# Patient Record
Sex: FEMALE | Race: BLACK OR AFRICAN AMERICAN | Employment: OTHER | URBAN - METROPOLITAN AREA
[De-identification: names, ages, dates, MRNs, and addresses within clinical notes are randomized per-mention and may not be internally consistent; named-entity substitution may affect disease eponyms.]

---

## 2018-12-18 ENCOUNTER — HOSPITAL ENCOUNTER (OUTPATIENT)
Dept: MAMMOGRAPHY | Age: 63
Discharge: HOME | End: 2018-12-18
Attending: FAMILY MEDICINE
Payer: COMMERCIAL

## 2018-12-18 DIAGNOSIS — Z12.39 BREAST CANCER SCREENING: ICD-10-CM

## 2018-12-18 PROCEDURE — 77067 SCR MAMMO BI INCL CAD: CPT

## 2019-01-01 ENCOUNTER — HOSPITAL ENCOUNTER (EMERGENCY)
Age: 64
Discharge: HOME | End: 2019-01-01
Attending: EMERGENCY MEDICINE
Payer: COMMERCIAL

## 2019-01-01 VITALS
OXYGEN SATURATION: 98 % | TEMPERATURE: 98.1 F | WEIGHT: 198 LBS | DIASTOLIC BLOOD PRESSURE: 74 MMHG | HEIGHT: 63 IN | HEART RATE: 66 BPM | RESPIRATION RATE: 18 BRPM | BODY MASS INDEX: 35.08 KG/M2 | SYSTOLIC BLOOD PRESSURE: 130 MMHG

## 2019-01-01 DIAGNOSIS — I48.91 ATRIAL FIBRILLATION WITH RAPID VENTRICULAR RESPONSE (HCC): Primary | ICD-10-CM

## 2019-01-01 LAB
ALBUMIN SERPL-MCNC: 3.5 G/DL (ref 3.2–4.6)
ALBUMIN/GLOB SERPL: 0.8 {RATIO} (ref 1.2–3.5)
ALP SERPL-CCNC: 101 U/L (ref 50–136)
ALT SERPL-CCNC: 28 U/L (ref 12–65)
ANION GAP SERPL CALC-SCNC: 5 MMOL/L (ref 7–16)
AST SERPL-CCNC: 28 U/L (ref 15–37)
ATRIAL RATE: 78 BPM
BACTERIA URNS QL MICRO: 0 /HPF
BASOPHILS # BLD: 0 K/UL (ref 0–0.2)
BASOPHILS NFR BLD: 1 % (ref 0–2)
BILIRUB SERPL-MCNC: 0.4 MG/DL (ref 0.2–1.1)
BUN SERPL-MCNC: 17 MG/DL (ref 8–23)
CALCIUM SERPL-MCNC: 8.6 MG/DL (ref 8.3–10.4)
CALCULATED R AXIS, ECG10: 54 DEGREES
CALCULATED T AXIS, ECG11: 97 DEGREES
CASTS URNS QL MICRO: NORMAL /LPF
CHLORIDE SERPL-SCNC: 105 MMOL/L (ref 98–107)
CO2 SERPL-SCNC: 31 MMOL/L (ref 21–32)
CREAT SERPL-MCNC: 1.05 MG/DL (ref 0.6–1)
DIAGNOSIS, 93000: NORMAL
DIFFERENTIAL METHOD BLD: ABNORMAL
EOSINOPHIL # BLD: 0.2 K/UL (ref 0–0.8)
EOSINOPHIL NFR BLD: 4 % (ref 0.5–7.8)
EPI CELLS #/AREA URNS HPF: NORMAL /HPF
ERYTHROCYTE [DISTWIDTH] IN BLOOD BY AUTOMATED COUNT: 14.9 % (ref 11.9–14.6)
GLOBULIN SER CALC-MCNC: 4.2 G/DL (ref 2.3–3.5)
GLUCOSE SERPL-MCNC: 137 MG/DL (ref 65–100)
HCT VFR BLD AUTO: 41.2 % (ref 35.8–46.3)
HGB BLD-MCNC: 13.2 G/DL (ref 11.7–15.4)
IMM GRANULOCYTES # BLD: 0 K/UL (ref 0–0.5)
IMM GRANULOCYTES NFR BLD AUTO: 1 % (ref 0–5)
LYMPHOCYTES # BLD: 2.2 K/UL (ref 0.5–4.6)
LYMPHOCYTES NFR BLD: 43 % (ref 13–44)
MCH RBC QN AUTO: 28.3 PG (ref 26.1–32.9)
MCHC RBC AUTO-ENTMCNC: 32 G/DL (ref 31.4–35)
MCV RBC AUTO: 88.2 FL (ref 79.6–97.8)
MONOCYTES # BLD: 0.5 K/UL (ref 0.1–1.3)
MONOCYTES NFR BLD: 10 % (ref 4–12)
NEUTS SEG # BLD: 2.1 K/UL (ref 1.7–8.2)
NEUTS SEG NFR BLD: 42 % (ref 43–78)
NRBC # BLD: 0 K/UL (ref 0–0.2)
PLATELET # BLD AUTO: 190 K/UL (ref 150–450)
PMV BLD AUTO: 12.7 FL (ref 9.4–12.3)
POTASSIUM SERPL-SCNC: 3.2 MMOL/L (ref 3.5–5.1)
PROT SERPL-MCNC: 7.7 G/DL (ref 6.3–8.2)
Q-T INTERVAL, ECG07: 330 MS
QRS DURATION, ECG06: 84 MS
QTC CALCULATION (BEZET), ECG08: 470 MS
RBC # BLD AUTO: 4.67 M/UL (ref 4.05–5.2)
RBC #/AREA URNS HPF: NORMAL /HPF
SODIUM SERPL-SCNC: 141 MMOL/L (ref 136–145)
TROPONIN I SERPL-MCNC: <0.02 NG/ML (ref 0.02–0.05)
VENTRICULAR RATE, ECG03: 122 BPM
WBC # BLD AUTO: 5.1 K/UL (ref 4.3–11.1)
WBC URNS QL MICRO: NORMAL /HPF

## 2019-01-01 PROCEDURE — 81003 URINALYSIS AUTO W/O SCOPE: CPT | Performed by: EMERGENCY MEDICINE

## 2019-01-01 PROCEDURE — 84484 ASSAY OF TROPONIN QUANT: CPT

## 2019-01-01 PROCEDURE — 74011250637 HC RX REV CODE- 250/637: Performed by: EMERGENCY MEDICINE

## 2019-01-01 PROCEDURE — 99285 EMERGENCY DEPT VISIT HI MDM: CPT | Performed by: EMERGENCY MEDICINE

## 2019-01-01 PROCEDURE — 81015 MICROSCOPIC EXAM OF URINE: CPT

## 2019-01-01 PROCEDURE — 93005 ELECTROCARDIOGRAM TRACING: CPT | Performed by: EMERGENCY MEDICINE

## 2019-01-01 PROCEDURE — 85025 COMPLETE CBC W/AUTO DIFF WBC: CPT

## 2019-01-01 PROCEDURE — 80053 COMPREHEN METABOLIC PANEL: CPT

## 2019-01-01 RX ADMIN — APIXABAN 5 MG: 5 TABLET, FILM COATED ORAL at 03:05

## 2019-01-01 NOTE — ED TRIAGE NOTES
Pt arrived from home, alone, via [facility] with c/o shortness of breath. Pt was awakened by a rigning telephone and felt her heart racing and a difficulty in breathing. Pt called EMS. No c/o diff breathing at this time.

## 2019-01-01 NOTE — DISCHARGE INSTRUCTIONS
Avoid Advil, Aleve, ibuprofen, Motrin products. Start Eliquis (a blood thinner) for atrial fibrillation. Twice a day. The cardiologist will help determine if you need to continue taking it long-term to prevent stroke and blood clot formation.   Return immediately if you have any other emergency

## 2019-01-01 NOTE — ED NOTES
I have reviewed discharge instructions with the patient. The patient verbalized understanding. Patient left ED via Discharge Method: ambulatory to Home with lyft Opportunity for questions and clarification provided. Patient given 1 scripts. To continue your aftercare when you leave the hospital, you may receive an automated call from our care team to check in on how you are doing. This is a free service and part of our promise to provide the best care and service to meet your aftercare needs.  If you have questions, or wish to unsubscribe from this service please call [phone]. Thank you for Choosing our [facility] Emergency Department.

## 2019-01-01 NOTE — ED PROVIDER NOTES
HPI: 
70 female history high blood pressure, high cholesterol, diabetes is here with shortness of breath. Her sister woke her up with a phone call. When she got up she started to feel short of breath. Cannot catch her breath. This went on for 45 minutes. It and call 911 to bring her in. She denies any chest pain, shortness of breath before this. Currently she feels slightly lightheaded. Had some mild tightness in her chest. 
She traveled by train a few weeks ago the [place]. He was able to get up and walk around. She has no leg swelling. No hemoptysis. Not a blood thinner. No prior heart problems. ROS Constitutional: No fever, no chills Skin: no rash Eye: No vision changes ENMT: No sore throat Respiratory: No shortness of breath, no cough Cardiovascular: No chest pain, no palpitations. Gastrointestinal: No vomiting, no nausea, no diarrhea, no abdominal pain : No dysuria MSK: No back pain, no muscle pain, no joint pain Neuro: No headache, no change in mental status, no numbness, no tingling, no weakness Psych:  
Endocrine:  
All other review of systems positive per history of present illness and the above otherwise negative or noncontributory. Visit Vitals /60 Pulse 65 Temp 98.1 °F (36.7 °C) Resp 18 Ht 5' 3\" (1.6 m) Wt 89.8 kg (198 lb) SpO2 98% BMI 35.07 kg/m² Past Medical History:  
Diagnosis Date  Arthritis   
 both hips as seen on x-rays in 12/13  Diabetes mellitus ([facility] [address]   
 controlled  Essential hypertension, benign Since 2009  Hyperlipidemia   
 acquired  Nonspecific abnormal electrocardiogram (ECG) (EKG)   
 5/1/14 EKG shows SB with HR 58, PACs and extensive T-wave changes  [place] on CPAP Since 6/14  Sleep apnea  Urticaria   
 chronic No past surgical history on file. Prior to Admission Medications Prescriptions Last Dose Informant Patient Reported? Taking? clotrimazole-betamethasone (LOTRISONE) topical cream   No No  
Sig: Apply  to affected area two (2) times a day. hydroCHLOROthiazide (HYDRODIURIL) 25 mg tablet   No No  
Sig: Take 1 Tab by mouth daily. Indications: Hypertension  
hydrocortisone butyrate (LOCOID) 0.1 % oint   No No  
Sig: Use twice daily  
ibuprofen (MOTRIN) 800 mg tablet   No No  
Sig: Take 1 Tab by mouth two (2) times daily as needed for Pain (take with food). Indications: PAIN  
loratadine (CLARITIN) 10 mg tablet   No No  
Sig: Take 1 Tab by mouth daily. losartan (COZAAR) 100 mg tablet   No No  
Sig: TAKE ONE TABLET BY MOUTH ONCE DAILY  
metFORMIN (GLUCOPHAGE) 500 mg tablet   No No  
Sig: TAKE 1 TABLET BY MOUTH AT BEDTIME  
multivitamin (ONE A DAY) tablet   Yes No  
Sig: Take 1 Tab by mouth daily. pravastatin (PRAVACHOL) 20 mg tablet   No No  
Sig: TAKE 1 TABLET BY MOUTH AT BEDTIME Facility-Administered Medications: None Adult Exam  
General: alert, no acute distress Head: normocephalic, atraumatic ENT: moist mucous membranes Neck: supple, non-tender; full range of motion Cardiovascular: tachycardic and irregularly irregular, normal peripheral perfusion, no edema. Equal radial pulses Respiratory:  normal respirations; no wheezing, rales or rhonchi Gastrointestinal: soft, non-tender; no rebound or guarding, no peritoneal signs, no distension Back: non-tender, full range of motion Musculoskeletal: normal range of motion, normal strength, no gross deformities Neurological: alert and oriented x 4, no gross focal deficits; normal speech Psychiatric: cooperative; appropriate mood and affect MDM: 
EKG atrial fibrillation with rapid ventricular response at rate of 122. Normal QS complex. No STEMI noted Apparently new onset A. Fib. Troponin negative. Electrolytes unremarkable. Bedside modified Valsalva maneuver performed. Patient performed very well. Total time of procedure took approximately 12 minutes. She converted. Second EKG sinus bradycardia at 58 with normal interval, axis. No STEMI or ischemic changes noted. 2:27 AM 
I spoke with Dr. Trent Delong from cardiology. Recommend Eliquis 5 mg twice a day with prescription to go home and follow-up with cardiology for further evaluation. No results found. Recent Results (from the past 24 hour(s)) CBC WITH AUTOMATED DIFF Collection Time: 01/01/19  1:08 AM  
Result Value Ref Range WBC 5.1 4.3 - 11.1 K/uL  
 RBC 4.67 4.05 - 5.2 M/uL  
 HGB 13.2 11.7 - 15.4 g/dL HCT 41.2 35.8 - 46.3 % MCV 88.2 79.6 - 97.8 FL  
 MCH 28.3 26.1 - 32.9 PG  
 MCHC 32.0 31.4 - 35.0 g/dL  
 RDW 14.9 (H) 11.9 - 14.6 % PLATELET 480 343 - 792 K/uL MPV 12.7 (H) 9.4 - 12.3 FL ABSOLUTE NRBC 0.00 0.0 - 0.2 K/uL  
 DF AUTOMATED NEUTROPHILS 42 (L) 43 - 78 % LYMPHOCYTES 43 13 - 44 % MONOCYTES 10 4.0 - 12.0 % EOSINOPHILS 4 0.5 - 7.8 % BASOPHILS 1 0.0 - 2.0 % IMMATURE GRANULOCYTES 1 0.0 - 5.0 %  
 ABS. NEUTROPHILS 2.1 1.7 - 8.2 K/UL  
 ABS. LYMPHOCYTES 2.2 0.5 - 4.6 K/UL  
 ABS. MONOCYTES 0.5 0.1 - 1.3 K/UL  
 ABS. EOSINOPHILS 0.2 0.0 - 0.8 K/UL  
 ABS. BASOPHILS 0.0 0.0 - 0.2 K/UL  
 ABS. IMM. GRANS. 0.0 0.0 - 0.5 K/UL METABOLIC PANEL, COMPREHENSIVE Collection Time: 01/01/19  1:08 AM  
Result Value Ref Range Sodium 141 136 - 145 mmol/L Potassium 3.2 (L) 3.5 - 5.1 mmol/L Chloride 105 98 - 107 mmol/L  
 CO2 31 21 - 32 mmol/L Anion gap 5 (L) 7 - 16 mmol/L Glucose 137 (H) 65 - 100 mg/dL BUN 17 8 - 23 MG/DL Creatinine 1.05 (H) 0.6 - 1.0 MG/DL  
 GFR est AA >60 >60 ml/min/1.73m2 GFR est non-AA 56 (L) >60 ml/min/1.73m2 Calcium 8.6 8.3 - 10.4 MG/DL Bilirubin, total 0.4 0.2 - 1.1 MG/DL  
 ALT (SGPT) 28 12 - 65 U/L  
 AST (SGOT) 28 15 - 37 U/L Alk. phosphatase 101 50 - 136 U/L Protein, total 7.7 6.3 - 8.2 g/dL Albumin 3.5 3.2 - 4.6 g/dL Globulin 4.2 (H) 2.3 - 3.5 g/dL A-G Ratio 0.8 (L) 1.2 - 3.5 EKG, 12 LEAD, INITIAL Collection Time: 01/01/19  1:38 AM  
Result Value Ref Range Ventricular Rate 58 BPM  
 Atrial Rate 58 BPM  
 P-R Interval 164 ms QRS Duration 90 ms Q-T Interval 422 ms QTC Calculation (Bezet) 414 ms Calculated P Axis 61 degrees Calculated R Axis 60 degrees Calculated T Axis 39 degrees Diagnosis Sinus bradycardia Nonspecific ST and T wave abnormality Abnormal ECG When compared with ECG of 01-JAN-2019 01:06, Sinus rhythm has replaced Atrial fibrillation Vent. rate has decreased BY  64 BPM 
ST no longer depressed in Anterior leads T wave inversion no longer evident in Inferior leads URINE MICROSCOPIC Collection Time: 01/01/19  1:48 AM  
Result Value Ref Range WBC 0-3 0 /hpf  
 RBC 0-3 0 /hpf Epithelial cells 0-3 0 /hpf Bacteria 0 0 /hpf Casts 0-3 0 /lpf Dragon voice recognition software was used to create this note. Although the note has been reviewed and corrected where necessary, additional errors may have been overlooked and remain in the text.

## 2019-03-18 ENCOUNTER — HOSPITAL ENCOUNTER (OUTPATIENT)
Dept: MAMMOGRAPHY | Age: 64
Discharge: HOME | End: 2019-03-18
Attending: FAMILY MEDICINE
Payer: COMMERCIAL

## 2019-03-18 DIAGNOSIS — R92.8 ABNORMAL SCREENING MAMMOGRAM: ICD-10-CM

## 2019-03-18 PROCEDURE — 76642 ULTRASOUND BREAST LIMITED: CPT

## 2019-03-18 PROCEDURE — 77065 DX MAMMO INCL CAD UNI: CPT

## 2021-03-12 ENCOUNTER — TRANSCRIBE ORDER (OUTPATIENT)
Dept: SCHEDULING | Age: 66
End: 2021-03-12

## 2021-03-12 DIAGNOSIS — Z12.31 ENCOUNTER FOR SCREENING MAMMOGRAM FOR MALIGNANT NEOPLASM OF BREAST: Primary | ICD-10-CM

## 2021-03-12 DIAGNOSIS — Z78.0 POST-MENOPAUSAL: Primary | ICD-10-CM

## 2022-06-03 ENCOUNTER — TELEPHONE (OUTPATIENT)
Dept: DIABETES SERVICES | Age: 67
End: 2022-06-03

## 2022-06-08 ENCOUNTER — FOLLOWUP TELEPHONE ENCOUNTER (OUTPATIENT)
Dept: DIABETES SERVICES | Age: 67
End: 2022-06-08

## 2022-06-15 ENCOUNTER — TELEPHONE (OUTPATIENT)
Dept: DIABETES SERVICES | Age: 67
End: 2022-06-15

## 2022-06-15 NOTE — TELEPHONE ENCOUNTER
Third call,mail box full. Will mail letter. If do not hear back by 7-6-2022, will close file. Notified referring.

## 2022-08-23 ENCOUNTER — HOSPITAL ENCOUNTER (OUTPATIENT)
Dept: DIABETES SERVICES | Age: 67
Setting detail: RECURRING SERIES
Discharge: HOME | End: 2022-08-26
Payer: MEDICARE

## 2022-08-23 PROCEDURE — G0108 DIAB MANAGE TRN  PER INDIV: HCPCS

## 2022-08-23 RX ORDER — HYDROCHLOROTHIAZIDE 25 MG/1
25 TABLET ORAL DAILY
COMMUNITY

## 2022-08-23 RX ORDER — MULTIVITAMIN WITH IRON
1 TABLET ORAL DAILY
COMMUNITY

## 2022-08-23 RX ORDER — TRIAMCINOLONE ACETONIDE 5 MG/G
OINTMENT TOPICAL 2 TIMES DAILY
COMMUNITY

## 2022-08-23 RX ORDER — AMLODIPINE BESYLATE 2.5 MG/1
2.5 TABLET ORAL DAILY
COMMUNITY

## 2022-08-23 RX ORDER — LOSARTAN POTASSIUM 100 MG/1
100 TABLET ORAL DAILY
COMMUNITY

## 2022-08-23 RX ORDER — POTASSIUM CHLORIDE 1.5 G/1.77G
20 POWDER, FOR SOLUTION ORAL 2 TIMES DAILY
COMMUNITY

## 2022-08-23 SDOH — ECONOMIC STABILITY: FOOD INSECURITY: ADDITIONAL INFORMATION: NO

## 2022-08-23 ASSESSMENT — PROBLEM AREAS IN DIABETES QUESTIONNAIRE (PAID)
FEELING THAT DIABETES IS TAKING UP TOO MUCH OF YOUR MENTAL AND PHYSICAL ENERGY EVERY DAY: 0
FEELING SCARED WHEN YOU THINK ABOUT LIVING WITH DIABETES: 0
WORRYING ABOUT THE FUTURE AND THE POSSIBILITY OF SERIOUS COMPLICATIONS: 0
COPING WITH COMPLICATIONS OF DIABETES: 0
PAID-5 TOTAL SCORE: 0
FEELING DEPRESSED WHEN YOU THINK ABOUT LIVING WITH DIABETES: 0

## 2022-08-23 ASSESSMENT — PATIENT HEALTH QUESTIONNAIRE - PHQ9
SUM OF ALL RESPONSES TO PHQ QUESTIONS 1-9: 0

## 2022-08-23 NOTE — PROGRESS NOTES
This is a one on one appointment. Due to being during XCXKP-76 public health emergency, social distancing and mandatory precautions are in place and utilized. Came for diabetes educational assessment today. Provided basic information on carbohydrates, proteins and fats. Educational need/plan: Will attend 2 nutrition/2 diabetes sessions to address the following: diabetes disease process, nutritional management, physical activity, using medications, preventing complications, psychosocial adjustment, goal setting, problem solving, monitoring, behavior change strategies. Hopes to gain the following from this educational program:  Being active, healthy coping, healthy eating, monitoring blood sugars, problem solving, taking medications and reducing risk of complications. Issues Identified:  -Vegan. Her protein sources are beans,peas, lentils, and peanut butter. She does not eat meat, cheese or eggs.   -She wants to make sure she is getting enough protein and vitamins.  -She wants to lose weight  -Provided Medications, Lipids, and Hgb A1C, Height and Weight from My Chart. This is external referral.  -She would like the Dexcom G6. Will send message to her PCP via fax. Medication Reconciliation completed at today's visit.

## 2022-08-24 ENCOUNTER — HOSPITAL ENCOUNTER (OUTPATIENT)
Dept: DIABETES SERVICES | Age: 67
Setting detail: RECURRING SERIES
Discharge: HOME | End: 2022-08-27
Payer: MEDICARE

## 2022-08-24 PROCEDURE — G0109 DIAB MANAGE TRN IND/GROUP: HCPCS

## 2022-08-24 NOTE — PROGRESS NOTES
This is a class  appointment with limited persons allowed in class due to RDNCT-06 public health emergency. Social distancing and mandatory precautions are in place and utilized. Participant attended Diabetes #1 session today. Topics included: Characteristics/pathophysiology type 1/type 2 diabetes; Goal/acceptable blood glucose ranges/Hgb A1C/interpreting/using results;meters, continuous glucose monitors and insulin pumps. Using medications safely; Sick day management; Prevention/detection/treatment of acute complications. - Verbalized understanding of material covered.  -Anticipated adherence is good   -Problems/barriers may be  none anticipated   Personal   Vegan and wants to lose weight and make sure getting proper amount of protein and nutrients in her diet. Only eats peanut butter and beans, peas and lentils as protein source. Medication Reconciliation Completed. No surgery or procedure.

## 2022-08-25 ENCOUNTER — HOSPITAL ENCOUNTER (OUTPATIENT)
Dept: DIABETES SERVICES | Age: 67
Setting detail: RECURRING SERIES
Discharge: HOME | End: 2022-08-28
Payer: MEDICARE

## 2022-08-25 PROCEDURE — G0109 DIAB MANAGE TRN IND/GROUP: HCPCS

## 2022-08-29 ENCOUNTER — HOSPITAL ENCOUNTER (OUTPATIENT)
Dept: DIABETES SERVICES | Age: 67
Setting detail: RECURRING SERIES
Discharge: HOME | End: 2022-09-01
Payer: MEDICARE

## 2022-08-29 PROCEDURE — G0109 DIAB MANAGE TRN IND/GROUP: HCPCS

## 2022-08-29 NOTE — PROGRESS NOTES
This is a class  appointment with limited persons allowed in class due to IYFXK-50 public health emergency. Social distancing and mandatory precautions are in place and utilized. Participant attended Diabetes #2 session today. Topics included: Prevention/detection/treatment of chronic complications; sleep apnea; Developing strategies to promote health/change behavior/recommended screenings; Developing strategies to address psychosocial issues; Goal setting. Participants goal/support plan includes:        Diabetes Goal: To better control B/P, Cholesterol, and blood sugar, I will exercise 15-30 minutes 3-5 days a week beginning today. I will reevaluate in two weeks and increase as tolerated. Diabetes Support Plan: Refer to Diabetes Education Material        Problems/barriers may be:none anticipated         Plan for follow up/Recommendations: Nutrition Two                    Medication Reconciliation Completed. No new surgery or procedures.

## 2022-08-30 ENCOUNTER — HOSPITAL ENCOUNTER (OUTPATIENT)
Dept: DIABETES SERVICES | Age: 67
Setting detail: RECURRING SERIES
Discharge: HOME | End: 2022-09-02
Payer: MEDICARE

## 2022-08-30 PROCEDURE — G0109 DIAB MANAGE TRN IND/GROUP: HCPCS

## 2022-08-30 NOTE — PROGRESS NOTES
This is a class  appointment with limited persons allowed in class due to IOTZG-00 public health emergency. Social distancing and mandatory precautions are in place and utilized. Attended nutrition diabetes #2 group session today. Topics included: plate method for portion control; fiber and sodium guidelines; sugar substitutes; alcohol; eating out; recipe modification; label reading. Participant's goal: To keep blood sugars in target ranges she will eat a 7 gram protein minimum with every meal/snack and re-evaluate November 29, 2022. Participant's diabetes support plan: Use the vegan protein handout provided. Barriers identified: none  Voiced/demonstrated understanding of material covered. Anticipated adherence is good. No medication changes, procedures or surgeries since last visit. Plan for follow up is: will be sent a follow up questionnaire at 6 months and one year.

## 2022-09-29 ENCOUNTER — TELEPHONE (OUTPATIENT)
Dept: DIABETES SERVICES | Age: 67
End: 2022-09-29

## 2023-02-22 ENCOUNTER — TELEPHONE (OUTPATIENT)
Dept: DIABETES SERVICES | Age: 68
End: 2023-02-22

## 2023-02-22 NOTE — PROGRESS NOTES
Attempted to contact patient for 6 month follow up call for [employer] [facility], but was unable to reach patient. Will mail follow-up survey instead.

## 2024-09-02 ENCOUNTER — HOSPITAL ENCOUNTER (EMERGENCY)
Age: 69
Discharge: HOME | End: 2024-09-02
Attending: EMERGENCY MEDICINE
Payer: MEDICARE

## 2024-09-02 VITALS
TEMPERATURE: 98.4 F | HEIGHT: 63 IN | OXYGEN SATURATION: 98 % | RESPIRATION RATE: 18 BRPM | BODY MASS INDEX: 33.31 KG/M2 | SYSTOLIC BLOOD PRESSURE: 126 MMHG | WEIGHT: 188 LBS | DIASTOLIC BLOOD PRESSURE: 85 MMHG | HEART RATE: 63 BPM

## 2024-09-02 DIAGNOSIS — K11.20 SIALOADENITIS: Primary | ICD-10-CM

## 2024-09-02 DIAGNOSIS — K11.5 SIALOLITHIASIS OF SUBMANDIBULAR GLAND: ICD-10-CM

## 2024-09-02 PROCEDURE — 99283 EMERGENCY DEPT VISIT LOW MDM: CPT

## 2024-09-02 RX ORDER — CLINDAMYCIN HCL 300 MG
300 CAPSULE ORAL 3 TIMES DAILY
Qty: 21 CAPSULE | Refills: 0 | Status: SHIPPED | OUTPATIENT
Start: 2024-09-02 | End: 2024-09-09

## 2024-09-02 RX ORDER — DICLOFENAC POTASSIUM 50 MG/1
50 TABLET, FILM COATED ORAL 3 TIMES DAILY
Qty: 20 TABLET | Refills: 0 | Status: SHIPPED | OUTPATIENT
Start: 2024-09-02

## 2024-09-02 ASSESSMENT — ENCOUNTER SYMPTOMS
VOMITING: 0
FACIAL SWELLING: 1
NAUSEA: 0
EYE REDNESS: 0
BACK PAIN: 0
PHOTOPHOBIA: 0
COLOR CHANGE: 0
CHEST TIGHTNESS: 0
CHOKING: 0

## 2024-09-02 ASSESSMENT — LIFESTYLE VARIABLES
HOW OFTEN DO YOU HAVE A DRINK CONTAINING ALCOHOL: NEVER
HOW MANY STANDARD DRINKS CONTAINING ALCOHOL DO YOU HAVE ON A TYPICAL DAY: PATIENT DOES NOT DRINK

## 2024-09-02 ASSESSMENT — PAIN - FUNCTIONAL ASSESSMENT
PAIN_FUNCTIONAL_ASSESSMENT: 0-10
PAIN_FUNCTIONAL_ASSESSMENT: 0-10

## 2024-09-02 ASSESSMENT — PAIN SCALES - GENERAL
PAINLEVEL_OUTOF10: 0
PAINLEVEL_OUTOF10: 1

## 2024-09-02 ASSESSMENT — PAIN DESCRIPTION - LOCATION: LOCATION: THROAT

## 2024-09-02 ASSESSMENT — PAIN DESCRIPTION - PAIN TYPE: TYPE: ACUTE PAIN

## 2024-09-02 ASSESSMENT — PAIN DESCRIPTION - DESCRIPTORS: DESCRIPTORS: DISCOMFORT

## 2024-09-02 ASSESSMENT — PAIN DESCRIPTION - FREQUENCY: FREQUENCY: CONTINUOUS

## 2024-09-02 NOTE — ED PROVIDER NOTES
Emergency Department Provider Note       PCP: Lovely Crisostomo MD   Age: 69 y.o.   Sex: female     DISPOSITION Decision To Discharge 09/02/2024 01:19:55 PM  Condition at Disposition: Stable       ICD-10-CM    1. Sialoadenitis  K11.20       2. Sialolithiasis of submandibular gland  K11.5           Medical Decision Making     Patient is some tenderness of the right submandibular gland noted.  It is mildly enlarged.  Discussed with patient this could be related to sialadenitis versus sialolithiasis.  Given acuity feel more likely related to sialolithiasis.  She is talking.  Has no change in phonation.  No tongue swelling noted.  Plan to place on short course of NSAIDs as well as antibiotics.  Encourage close follow-up with primary care provider     1 acute complicated illness or injury.  Prescription drug management performed.  Shared medical decision making was utilized in creating the patients health plan today.    I independently ordered and reviewed each unique test.  I reviewed external records: ED visit note from an outside group.  I reviewed external records: provider visit note from PCP.  I reviewed external records: provider visit note from outside specialist.  I reviewed external records: previous EKG including cardiologist interpretation.    I reviewed external records: previous lab results from outside ED.  I reviewed external records: previous imaging study including radiologist interpretation.                     History     Patient presents to the ER complaining of swelling to the right side of her face.  Patient states she was eating watermelon with lemon juice and felt swelling to the right side of her face under her chin.  States to begin to feel as if it increased.  Report she may have had some ear pain and some headache.  Her she reports in the past 20 minutes swelling has decreased.  Denies any difficulty swallowing but did have some pain with swallowing.  Denies any tongue swelling.  Denies

## 2024-09-02 NOTE — ED TRIAGE NOTES
Pt to the ED from home with c/o of sudden onset pain and edema in her throat that started today about 30 mins prior to arrival to the ED. Pt state that she was eating watermelon when this started. Pt states that has happen in the past when we was eating mangos,pt states that her dr told her she was allergic to it.       NO medications where taken prior to arrival to the ed. Pt states that the lump in her throat has went down.

## 2024-09-02 NOTE — DISCHARGE INSTRUCTIONS
Take medications as prescribed  Be mindful that sour tasting foods can cause contractions of the salivary glands  Follow-up with your primary care provider  Return to the ER for any new, worsening or life-threatening symptoms    It has been my pleasure to care for you here in the ER  I hope we have addressed your concerns here today and offered you some answers  If you receive a survey concerning your care here, I hope you feel comfortable with giving your care team positive scores

## 2024-10-31 ENCOUNTER — TELEPHONE (OUTPATIENT)
Dept: INTERNAL MEDICINE CLINIC | Facility: CLINIC | Age: 69
End: 2024-10-31

## 2024-10-31 NOTE — TELEPHONE ENCOUNTER
----- Message from Riley GRAYSON sent at 10/31/2024  3:29 PM EDT -----  Regarding: E[facility] Appointment Request  E[facility] Appointment Request    Patient needs appointment for ECC Appointment Type: New to Provider.    Patient Requested Dates(s): as soon as possible  Patient Requested Time: as soon as possible  Provider Name: accepting new patient    Reason for Appointment Request: New Patient - Requested Provider unavailable  Patient experiencing nerve pain on her fit  --------------------------------------------------------------------------------------------------------------------------    Relationship to Patient: Self     Call Back Information: OK to leave message on voicemail  Preferred Call Back Number: Phone [phone]

## 2024-11-14 ENCOUNTER — TELEMEDICINE (OUTPATIENT)
Dept: INTERNAL MEDICINE CLINIC | Facility: CLINIC | Age: 69
End: 2024-11-14
Payer: MEDICAID

## 2024-11-14 DIAGNOSIS — E78.2 MIXED HYPERLIPIDEMIA: ICD-10-CM

## 2024-11-14 DIAGNOSIS — Z12.11 SCREEN FOR COLON CANCER: ICD-10-CM

## 2024-11-14 DIAGNOSIS — Z85.41 HISTORY OF CERVICAL CANCER: ICD-10-CM

## 2024-11-14 DIAGNOSIS — Z76.89 ENCOUNTER TO ESTABLISH CARE WITH NEW DOCTOR: Primary | ICD-10-CM

## 2024-11-14 DIAGNOSIS — E11.9 CONTROLLED TYPE 2 DIABETES MELLITUS WITHOUT COMPLICATION, WITHOUT LONG-TERM CURRENT USE OF INSULIN (HCC): ICD-10-CM

## 2024-11-14 DIAGNOSIS — G47.33 OBSTRUCTIVE SLEEP APNEA SYNDROME: ICD-10-CM

## 2024-11-14 DIAGNOSIS — I48.0 PAF (PAROXYSMAL ATRIAL FIBRILLATION) (HCC): ICD-10-CM

## 2024-11-14 DIAGNOSIS — E55.9 VITAMIN D INSUFFICIENCY: ICD-10-CM

## 2024-11-14 DIAGNOSIS — I10 ESSENTIAL HYPERTENSION, BENIGN: ICD-10-CM

## 2024-11-14 DIAGNOSIS — M81.0 POSTMENOPAUSAL BONE LOSS: ICD-10-CM

## 2024-11-14 PROCEDURE — 99204 OFFICE O/P NEW MOD 45 MIN: CPT | Performed by: INTERNAL MEDICINE

## 2024-11-14 PROCEDURE — 1123F ACP DISCUSS/DSCN MKR DOCD: CPT | Performed by: INTERNAL MEDICINE

## 2024-11-14 SDOH — ECONOMIC STABILITY: INCOME INSECURITY: HOW HARD IS IT FOR YOU TO PAY FOR THE VERY BASICS LIKE FOOD, HOUSING, MEDICAL CARE, AND HEATING?: NOT HARD AT ALL

## 2024-11-14 SDOH — ECONOMIC STABILITY: FOOD INSECURITY: WITHIN THE PAST 12 MONTHS, YOU WORRIED THAT YOUR FOOD WOULD RUN OUT BEFORE YOU GOT MONEY TO BUY MORE.: NEVER TRUE

## 2024-11-14 SDOH — ECONOMIC STABILITY: FOOD INSECURITY: WITHIN THE PAST 12 MONTHS, THE FOOD YOU BOUGHT JUST DIDN'T LAST AND YOU DIDN'T HAVE MONEY TO GET MORE.: NEVER TRUE

## 2024-11-14 SDOH — ECONOMIC STABILITY: TRANSPORTATION INSECURITY
IN THE PAST 12 MONTHS, HAS LACK OF TRANSPORTATION KEPT YOU FROM MEETINGS, WORK, OR FROM GETTING THINGS NEEDED FOR DAILY LIVING?: YES

## 2024-11-14 ASSESSMENT — PATIENT HEALTH QUESTIONNAIRE - PHQ9
SUM OF ALL RESPONSES TO PHQ QUESTIONS 1-9: 0
2. FEELING DOWN, DEPRESSED OR HOPELESS: NOT AT ALL
SUM OF ALL RESPONSES TO PHQ9 QUESTIONS 1 & 2: 0
SUM OF ALL RESPONSES TO PHQ QUESTIONS 1-9: 0
2. FEELING DOWN, DEPRESSED OR HOPELESS: NOT AT ALL
SUM OF ALL RESPONSES TO PHQ QUESTIONS 1-9: 0
SUM OF ALL RESPONSES TO PHQ9 QUESTIONS 1 & 2: 0
2. FEELING DOWN, DEPRESSED OR HOPELESS: NOT AT ALL
1. LITTLE INTEREST OR PLEASURE IN DOING THINGS: NOT AT ALL
SUM OF ALL RESPONSES TO PHQ9 QUESTIONS 1 & 2: 0
SUM OF ALL RESPONSES TO PHQ QUESTIONS 1-9: 0
1. LITTLE INTEREST OR PLEASURE IN DOING THINGS: NOT AT ALL
SUM OF ALL RESPONSES TO PHQ QUESTIONS 1-9: 0
SUM OF ALL RESPONSES TO PHQ QUESTIONS 1-9: 0
1. LITTLE INTEREST OR PLEASURE IN DOING THINGS: NOT AT ALL

## 2024-11-14 ASSESSMENT — ENCOUNTER SYMPTOMS: RESPIRATORY NEGATIVE: 1

## 2024-11-14 NOTE — ASSESSMENT & PLAN NOTE
Chronic, at goal (stable), continue current treatment plan; she states she is taking Xarelto as previously prescribed;

## 2024-11-14 NOTE — PROGRESS NOTES
Evelina Nunez, was evaluated through a synchronous (real-time) audio-video encounter. The patient (or guardian if applicable) is aware that this is a billable service, which includes applicable co-pays. This Virtual Visit was conducted with patient's (and/or legal guardian's) consent. Patient identification was verified, and a caregiver was present when appropriate.   The patient was located at Home: [address]  [facility] [address]  Provider was located at Facility (Appt Dept): [address] [place] [id number]  Confirm you are appropriately licensed, registered, or certified to deliver care in the state where the patient is located as indicated above. If you are not or unsure, please re-schedule the visit: Yes, I confirm.     Evelina Nunez (:  1955) is a New patient, presenting virtually for evaluation of the following:      Below is the assessment and plan developed based on review of pertinent history, physical exam, labs, studies, and medications.     Assessment & Plan  Encounter to establish care with new doctor   Patient's medical history was reviewed in detail today.  Recommended healthy diet, regular exercise.            Essential hypertension, benign   Recommended low sodium diet; Goal: take meds as prescribed, monitor blood pressure at home and call with readings.         Orders:    Urinalysis; Future    PAF (paroxysmal atrial fibrillation) (HCC)   Chronic, at goal (stable), continue current treatment plan; she states she is taking Xarelto as previously prescribed;          Obstructive sleep apnea syndrome    She is using CPAP nightly;          Controlled type 2 diabetes mellitus without complication, without long-term current use of insulin (HCC)   Recommended low carbohydrate diet; Recommended yearly eye exam, dental exam, daily foot exam; discussed increased risk of retinopathy, nephropathy, neuropathy, and  cardiovascular events if diabetes is not

## 2024-11-14 NOTE — ASSESSMENT & PLAN NOTE
Recommended low sodium diet; Goal: take meds as prescribed, monitor blood pressure at home and call with readings.         Orders:    Urinalysis; Future

## 2024-11-14 NOTE — ASSESSMENT & PLAN NOTE
She is to continue Vitamin D as previously prescribed;     Orders:    Vitamin D 25 Hydroxy; Future

## 2024-11-14 NOTE — ASSESSMENT & PLAN NOTE
Recommended low fat, low cholesterol diet, regular exercise.      Orders:    CBC with Auto Differential; Future    Comprehensive Metabolic Panel; Future    Lipid Panel; Future    TSH; Future    T4, Free; Future

## 2024-11-14 NOTE — ASSESSMENT & PLAN NOTE
Recommended low carbohydrate diet; Recommended yearly eye exam, dental exam, daily foot exam; discussed increased risk of retinopathy, nephropathy, neuropathy, and  cardiovascular events if diabetes is not well controlled; Goal: take medications as prescribed, monitor blood sugars daily and call with readings.       Orders:    Hemoglobin A1C; Future    Microalbumin / Creatinine Urine Ratio; Future

## 2024-11-15 ENCOUNTER — OFFICE VISIT (OUTPATIENT)
Dept: INTERNAL MEDICINE CLINIC | Facility: CLINIC | Age: 69
End: 2024-11-15

## 2024-11-15 ENCOUNTER — LAB (OUTPATIENT)
Dept: INTERNAL MEDICINE CLINIC | Facility: CLINIC | Age: 69
End: 2024-11-15

## 2024-11-15 ENCOUNTER — TELEPHONE (OUTPATIENT)
Dept: INTERNAL MEDICINE CLINIC | Facility: CLINIC | Age: 69
End: 2024-11-15

## 2024-11-15 VITALS
SYSTOLIC BLOOD PRESSURE: 132 MMHG | BODY MASS INDEX: 34.2 KG/M2 | WEIGHT: 193 LBS | HEIGHT: 63 IN | DIASTOLIC BLOOD PRESSURE: 76 MMHG | OXYGEN SATURATION: 97 % | HEART RATE: 48 BPM | TEMPERATURE: 98.4 F

## 2024-11-15 DIAGNOSIS — G62.9 NEUROPATHY: Primary | ICD-10-CM

## 2024-11-15 DIAGNOSIS — E11.9 DIET-CONTROLLED TYPE 2 DIABETES MELLITUS (HCC): ICD-10-CM

## 2024-11-15 DIAGNOSIS — I10 ESSENTIAL HYPERTENSION, BENIGN: ICD-10-CM

## 2024-11-15 DIAGNOSIS — E87.6 HYPOKALEMIA: ICD-10-CM

## 2024-11-15 DIAGNOSIS — G62.9 NEUROPATHY: ICD-10-CM

## 2024-11-15 LAB
ANION GAP SERPL CALC-SCNC: 10 MMOL/L (ref 7–16)
BASOPHILS # BLD: 0 K/UL (ref 0–0.2)
BASOPHILS NFR BLD: 1 % (ref 0–2)
BUN SERPL-MCNC: 13 MG/DL (ref 8–23)
CALCIUM SERPL-MCNC: 9.2 MG/DL (ref 8.8–10.2)
CHLORIDE SERPL-SCNC: 105 MMOL/L (ref 98–107)
CO2 SERPL-SCNC: 28 MMOL/L (ref 20–29)
CREAT SERPL-MCNC: 1.03 MG/DL (ref 0.6–1.1)
DIFFERENTIAL METHOD BLD: ABNORMAL
EOSINOPHIL # BLD: 0.1 K/UL (ref 0–0.8)
EOSINOPHIL NFR BLD: 2 % (ref 0.5–7.8)
ERYTHROCYTE [DISTWIDTH] IN BLOOD BY AUTOMATED COUNT: 15 % (ref 11.9–14.6)
EST. AVERAGE GLUCOSE BLD GHB EST-MCNC: 117 MG/DL
FERRITIN SERPL-MCNC: 108 NG/ML (ref 8–388)
GLUCOSE SERPL-MCNC: 82 MG/DL (ref 70–99)
HBA1C MFR BLD: 5.7 % (ref 0–5.6)
HCT VFR BLD AUTO: 41.8 % (ref 35.8–46.3)
HGB BLD-MCNC: 13.3 G/DL (ref 11.7–15.4)
IMM GRANULOCYTES # BLD AUTO: 0 K/UL (ref 0–0.5)
IMM GRANULOCYTES NFR BLD AUTO: 0 % (ref 0–5)
LYMPHOCYTES # BLD: 1.2 K/UL (ref 0.5–4.6)
LYMPHOCYTES NFR BLD: 36 % (ref 13–44)
MCH RBC QN AUTO: 29.6 PG (ref 26.1–32.9)
MCHC RBC AUTO-ENTMCNC: 31.8 G/DL (ref 31.4–35)
MCV RBC AUTO: 92.9 FL (ref 82–102)
MONOCYTES # BLD: 0.3 K/UL (ref 0.1–1.3)
MONOCYTES NFR BLD: 10 % (ref 4–12)
NEUTS SEG # BLD: 1.7 K/UL (ref 1.7–8.2)
NEUTS SEG NFR BLD: 51 % (ref 43–78)
NRBC # BLD: 0 K/UL (ref 0–0.2)
PLATELET # BLD AUTO: 165 K/UL (ref 150–450)
PMV BLD AUTO: 12.8 FL (ref 9.4–12.3)
POTASSIUM SERPL-SCNC: 4.3 MMOL/L (ref 3.5–5.1)
RBC # BLD AUTO: 4.5 M/UL (ref 4.05–5.2)
SODIUM SERPL-SCNC: 143 MMOL/L (ref 136–145)
TSH W FREE THYROID IF ABNORMAL: 1.43 UIU/ML (ref 0.27–4.2)
VIT B12 SERPL-MCNC: 368 PG/ML (ref 193–986)
WBC # BLD AUTO: 3.4 K/UL (ref 4.3–11.1)

## 2024-11-15 ASSESSMENT — ENCOUNTER SYMPTOMS: BACK PAIN: 1

## 2024-11-15 NOTE — TELEPHONE ENCOUNTER
Order faxed to [facility] on 11/15/24. Pt notified and verbalized understanding.

## 2024-11-15 NOTE — PROGRESS NOTES
Chief Complaint   Patient presents with    Foot Problem     Feels like pins and needles,itchy, bilateral       HISTORY OF PRESENT ILLNESS:  Evelina Nunez is a very pleasant 69 y.o. female who presents with a complaint of nerve pain in both feet, described as pins and needles sensation. Symptoms have been present x years but seem to be worsening. Gets cramping in right foot. Paresthesias are intermittent and seem to be worse in cold or rainy weather. She does have chronic low back pain w/o radiculopathy.   Hands are affected as well- feels itching sensation on palms and several fingers (affects different fingers at different times).   Has DM- diagnosed years ago but well controlled with diet and wears diabetic shoes (needs Rx today). No hx of skin ulcerations, PVD, denies claudication symptoms. Reports dry skin on feet in summer but keeps them moisturized with lotion. Reports having cervical cancer in 2020 and did receive chemotherapy. Denies worsening symptoms at night or interference with sleep. She usually rubs her hands and feet and soaks in warm water, which seems to help. PMH also significant for htn, hypokalemia. Previously followed a vegan diet. No heavy ETOH use.           PAST MEDICAL HISTORY:   Current Outpatient Medications   Medication Sig    Misc. Devices MISC 1 each by Does not apply route daily DM shoes    potassium chloride (KLOR-CON) 20 MEQ packet Take 20 mEq by mouth 2 times daily    triamcinolone (ARISTOCORT) 0.5 % ointment Apply topically 2 times daily Apply topically 2 times daily, prn    amLODIPine (NORVASC) 2.5 MG tablet Take 1 tablet by mouth daily    losartan (COZAAR) 100 MG tablet Take 1 tablet by mouth daily    Multiple Vitamin (MULTIVITAMIN) TABS tablet Take 1 tablet by mouth daily     No current facility-administered medications for this visit.      Allergies   Allergen Reactions    Olmesartan Angioedema and Swelling    Mangifera Indica Swelling    Sulfa Antibiotics Dizziness or

## 2024-12-06 ENCOUNTER — PATIENT MESSAGE (OUTPATIENT)
Dept: INTERNAL MEDICINE CLINIC | Facility: CLINIC | Age: 69
End: 2024-12-06

## 2024-12-06 ENCOUNTER — TELEPHONE (OUTPATIENT)
Dept: INTERNAL MEDICINE CLINIC | Facility: CLINIC | Age: 69
End: 2024-12-06

## 2024-12-06 NOTE — TELEPHONE ENCOUNTER
Spoke with [facility] and asked that they please re-fax forms as we have not received them and ensured that they have the correct fax number for our office. They will re-fax today.

## 2024-12-06 NOTE — TELEPHONE ENCOUNTER
Astrid from [facility] called about diabetic shoes for pt but they do need the following as it is extensive paperwork.    -Needs appt notes from [facility] visit on 11/15/24    -Needs medicaid CMN completed    -paperwork all need to be signed and dated by the MD.    Astrid states it may be easier if someone calls her back.  Call back number is included in note and here at [phone].

## 2024-12-08 ENCOUNTER — HOSPITAL ENCOUNTER (INPATIENT)
Age: 69
LOS: 1 days | Discharge: HOME | DRG: 309 | End: 2024-12-10
Attending: EMERGENCY MEDICINE | Admitting: INTERNAL MEDICINE
Payer: MEDICARE

## 2024-12-08 DIAGNOSIS — I48.0 PAROXYSMAL ATRIAL FIBRILLATION WITH RAPID VENTRICULAR RESPONSE (HCC): Primary | ICD-10-CM

## 2024-12-08 DIAGNOSIS — I48.0 PAROXYSMAL ATRIAL FIBRILLATION (HCC): ICD-10-CM

## 2024-12-08 PROCEDURE — 99285 EMERGENCY DEPT VISIT HI MDM: CPT

## 2024-12-09 ENCOUNTER — APPOINTMENT (OUTPATIENT)
Dept: GENERAL RADIOLOGY | Age: 69
DRG: 309 | End: 2024-12-09
Payer: MEDICARE

## 2024-12-09 ENCOUNTER — APPOINTMENT (OUTPATIENT)
Dept: NON INVASIVE DIAGNOSTICS | Age: 69
DRG: 309 | End: 2024-12-09
Attending: INTERNAL MEDICINE
Payer: MEDICARE

## 2024-12-09 PROBLEM — I48.91 ATRIAL FIBRILLATION WITH RVR (HCC): Status: ACTIVE | Noted: 2024-12-09

## 2024-12-09 PROBLEM — I49.5 TACHYCARDIA-BRADYCARDIA SYNDROME (HCC): Status: ACTIVE | Noted: 2024-12-09

## 2024-12-09 LAB
ALBUMIN SERPL-MCNC: 3.5 G/DL (ref 3.2–4.6)
ALBUMIN/GLOB SERPL: 0.9 (ref 1–1.9)
ALP SERPL-CCNC: 112 U/L (ref 35–104)
ALT SERPL-CCNC: 15 U/L (ref 8–45)
ANION GAP SERPL CALC-SCNC: 12 MMOL/L (ref 7–16)
APTT PPP: 27.1 SEC (ref 23.3–37.4)
AST SERPL-CCNC: 27 U/L (ref 15–37)
BASOPHILS # BLD: 0 K/UL (ref 0–0.2)
BASOPHILS NFR BLD: 1 % (ref 0–2)
BILIRUB SERPL-MCNC: 0.4 MG/DL (ref 0–1.2)
BUN SERPL-MCNC: 12 MG/DL (ref 8–23)
CALCIUM SERPL-MCNC: 9.4 MG/DL (ref 8.8–10.2)
CHLORIDE SERPL-SCNC: 105 MMOL/L (ref 98–107)
CO2 SERPL-SCNC: 25 MMOL/L (ref 20–29)
CREAT SERPL-MCNC: 1.02 MG/DL (ref 0.6–1.1)
DIFFERENTIAL METHOD BLD: ABNORMAL
ECHO AO ASC DIAM: 3.3 CM
ECHO AO ASCENDING AORTA INDEX: 1.76 CM/M2
ECHO AO ROOT DIAM: 2.6 CM
ECHO AO ROOT INDEX: 1.38 CM/M2
ECHO AV AREA PEAK VELOCITY: 1.9 CM2
ECHO AV AREA VTI: 2 CM2
ECHO AV AREA/BSA PEAK VELOCITY: 1 CM2/M2
ECHO AV AREA/BSA VTI: 1.1 CM2/M2
ECHO AV MEAN GRADIENT: 8 MMHG
ECHO AV MEAN GRADIENT: 8 MMHG
ECHO AV MEAN VELOCITY: 1.3 M/S
ECHO AV PEAK GRADIENT: 16 MMHG
ECHO AV PEAK VELOCITY: 2 M/S
ECHO AV VELOCITY RATIO: 0.75
ECHO AV VTI: 42.7 CM
ECHO BSA: 1.95 M2
ECHO EST RA PRESSURE: 3 MMHG
ECHO IVC PROX: 1.1 CM
ECHO LA AREA 2C: 17.2 CM2
ECHO LA AREA 4C: 21 CM2
ECHO LA DIAMETER INDEX: 1.91 CM/M2
ECHO LA DIAMETER: 3.6 CM
ECHO LA MAJOR AXIS: 6.1 CM
ECHO LA MINOR AXIS: 5.5 CM
ECHO LA TO AORTIC ROOT RATIO: 1.38
ECHO LA VOL BP: 53 ML (ref 22–52)
ECHO LA VOL MOD A2C: 44 ML (ref 22–52)
ECHO LA VOL MOD A4C: 59 ML (ref 22–52)
ECHO LA VOL/BSA BIPLANE: 28 ML/M2 (ref 16–34)
ECHO LA VOLUME INDEX MOD A2C: 23 ML/M2 (ref 16–34)
ECHO LA VOLUME INDEX MOD A4C: 31 ML/M2 (ref 16–34)
ECHO LV E' LATERAL VELOCITY: 6.77 CM/S
ECHO LV E' SEPTAL VELOCITY: 4.54 CM/S
ECHO LV EDV A4C: 89 ML
ECHO LV EDV INDEX A4C: 47 ML/M2
ECHO LV EF PHYSICIAN: 60 %
ECHO LV EJECTION FRACTION A4C: 63 %
ECHO LV ESV A4C: 33 ML
ECHO LV ESV INDEX A4C: 18 ML/M2
ECHO LV FRACTIONAL SHORTENING: 33 % (ref 28–44)
ECHO LV INTERNAL DIMENSION DIASTOLE INDEX: 2.07 CM/M2
ECHO LV INTERNAL DIMENSION DIASTOLIC: 3.9 CM (ref 3.9–5.3)
ECHO LV INTERNAL DIMENSION SYSTOLIC INDEX: 1.38 CM/M2
ECHO LV INTERNAL DIMENSION SYSTOLIC: 2.6 CM
ECHO LV IVSD: 0.9 CM (ref 0.6–0.9)
ECHO LV MASS 2D: 113.6 G (ref 67–162)
ECHO LV MASS INDEX 2D: 60.4 G/M2 (ref 43–95)
ECHO LV POSTERIOR WALL DIASTOLIC: 1 CM (ref 0.6–0.9)
ECHO LV RELATIVE WALL THICKNESS RATIO: 0.51
ECHO LVOT AREA: 2.5 CM2
ECHO LVOT AV VTI INDEX: 0.77
ECHO LVOT DIAM: 1.8 CM
ECHO LVOT MEAN GRADIENT: 5 MMHG
ECHO LVOT PEAK GRADIENT: 9 MMHG
ECHO LVOT PEAK VELOCITY: 1.5 M/S
ECHO LVOT STROKE VOLUME INDEX: 44.4 ML/M2
ECHO LVOT SV: 83.4 ML
ECHO LVOT VTI: 32.8 CM
ECHO MV A VELOCITY: 1.07 M/S
ECHO MV AREA VTI: 2.5 CM2
ECHO MV E DECELERATION TIME (DT): 270 MS
ECHO MV E VELOCITY: 0.8 M/S
ECHO MV E/A RATIO: 0.75
ECHO MV E/E' LATERAL: 11.82
ECHO MV E/E' RATIO (AVERAGED): 14.72
ECHO MV E/E' SEPTAL: 17.62
ECHO MV LVOT VTI INDEX: 1.02
ECHO MV MAX VELOCITY: 1.1 M/S
ECHO MV MEAN GRADIENT: 1 MMHG
ECHO MV MEAN VELOCITY: 0.5 M/S
ECHO MV PEAK GRADIENT: 5 MMHG
ECHO MV VTI: 33.5 CM
ECHO PV ACCELERATION TIME (AT): 148 MS
ECHO PV MAX VELOCITY: 0.9 M/S
ECHO PV PEAK GRADIENT: 3 MMHG
ECHO RIGHT VENTRICULAR SYSTOLIC PRESSURE (RVSP): 37 MMHG
ECHO RV BASAL DIMENSION: 3.6 CM
ECHO RV FREE WALL PEAK S': 13.7 CM/S
ECHO RV INTERNAL DIMENSION: 2.7 CM
ECHO RV TAPSE: 1.9 CM (ref 1.7–?)
ECHO TV MAX VELOCITY: 2.9 M/S
ECHO TV REGURGITANT MAX VELOCITY: 2.92 M/S
ECHO TV REGURGITANT PEAK GRADIENT: 34 MMHG
EKG ATRIAL RATE: 140 BPM
EKG DIAGNOSIS: NORMAL
EKG Q-T INTERVAL: 288 MS
EKG QRS DURATION: 75 MS
EKG QTC CALCULATION (BAZETT): 448 MS
EKG R AXIS: 143 DEGREES
EKG T AXIS: -61 DEGREES
EKG VENTRICULAR RATE: 145 BPM
EOSINOPHIL # BLD: 0.1 K/UL (ref 0–0.8)
EOSINOPHIL NFR BLD: 2 % (ref 0.5–7.8)
ERYTHROCYTE [DISTWIDTH] IN BLOOD BY AUTOMATED COUNT: 14.8 % (ref 11.9–14.6)
ERYTHROCYTE [DISTWIDTH] IN BLOOD BY AUTOMATED COUNT: 15 % (ref 11.9–14.6)
GLOBULIN SER CALC-MCNC: 3.8 G/DL (ref 2.3–3.5)
GLUCOSE SERPL-MCNC: 106 MG/DL (ref 70–99)
HCT VFR BLD AUTO: 43.4 % (ref 35.8–46.3)
HCT VFR BLD AUTO: 44.1 % (ref 35.8–46.3)
HGB BLD-MCNC: 13.8 G/DL (ref 11.7–15.4)
HGB BLD-MCNC: 14.3 G/DL (ref 11.7–15.4)
IMM GRANULOCYTES # BLD AUTO: 0 K/UL (ref 0–0.5)
IMM GRANULOCYTES NFR BLD AUTO: 0 % (ref 0–5)
INR PPP: 1
LYMPHOCYTES # BLD: 1.5 K/UL (ref 0.5–4.6)
LYMPHOCYTES NFR BLD: 50 % (ref 13–44)
MAGNESIUM SERPL-MCNC: 2.1 MG/DL (ref 1.8–2.4)
MCH RBC QN AUTO: 29.4 PG (ref 26.1–32.9)
MCH RBC QN AUTO: 29.6 PG (ref 26.1–32.9)
MCHC RBC AUTO-ENTMCNC: 31.8 G/DL (ref 31.4–35)
MCHC RBC AUTO-ENTMCNC: 32.4 G/DL (ref 31.4–35)
MCV RBC AUTO: 91.3 FL (ref 82–102)
MCV RBC AUTO: 92.5 FL (ref 82–102)
MONOCYTES # BLD: 0.3 K/UL (ref 0.1–1.3)
MONOCYTES NFR BLD: 9 % (ref 4–12)
NEUTS SEG # BLD: 1.1 K/UL (ref 1.7–8.2)
NEUTS SEG NFR BLD: 38 % (ref 43–78)
NRBC # BLD: 0 K/UL (ref 0–0.2)
NRBC # BLD: 0 K/UL (ref 0–0.2)
PLATELET # BLD AUTO: 153 K/UL (ref 150–450)
PLATELET # BLD AUTO: 168 K/UL (ref 150–450)
PMV BLD AUTO: 11.6 FL (ref 9.4–12.3)
PMV BLD AUTO: 12 FL (ref 9.4–12.3)
POTASSIUM SERPL-SCNC: 3.6 MMOL/L (ref 3.5–5.1)
PROT SERPL-MCNC: 7.3 G/DL (ref 6.3–8.2)
PROTHROMBIN TIME: 13.3 SEC (ref 11.3–14.9)
RBC # BLD AUTO: 4.69 M/UL (ref 4.05–5.2)
RBC # BLD AUTO: 4.83 M/UL (ref 4.05–5.2)
SODIUM SERPL-SCNC: 142 MMOL/L (ref 136–145)
TROPONIN T SERPL HS-MCNC: 12 NG/L (ref 0–14)
UFH PPP CHRO-ACNC: 0.45 IU/ML (ref 0.3–0.7)
UFH PPP CHRO-ACNC: 0.47 IU/ML (ref 0.3–0.7)
UFH PPP CHRO-ACNC: <0.1 IU/ML (ref 0.3–0.7)
WBC # BLD AUTO: 3 K/UL (ref 4.3–11.1)
WBC # BLD AUTO: 3.1 K/UL (ref 4.3–11.1)

## 2024-12-09 PROCEDURE — 96361 HYDRATE IV INFUSION ADD-ON: CPT

## 2024-12-09 PROCEDURE — 2580000003 HC RX 258: Performed by: EMERGENCY MEDICINE

## 2024-12-09 PROCEDURE — 93005 ELECTROCARDIOGRAM TRACING: CPT | Performed by: EMERGENCY MEDICINE

## 2024-12-09 PROCEDURE — 83735 ASSAY OF MAGNESIUM: CPT

## 2024-12-09 PROCEDURE — 85520 HEPARIN ASSAY: CPT

## 2024-12-09 PROCEDURE — 96374 THER/PROPH/DIAG INJ IV PUSH: CPT

## 2024-12-09 PROCEDURE — 2500000003 HC RX 250 WO HCPCS: Performed by: EMERGENCY MEDICINE

## 2024-12-09 PROCEDURE — 6370000000 HC RX 637 (ALT 250 FOR IP): Performed by: INTERNAL MEDICINE

## 2024-12-09 PROCEDURE — 80053 COMPREHEN METABOLIC PANEL: CPT

## 2024-12-09 PROCEDURE — 85610 PROTHROMBIN TIME: CPT

## 2024-12-09 PROCEDURE — 36415 COLL VENOUS BLD VENIPUNCTURE: CPT

## 2024-12-09 PROCEDURE — 93005 ELECTROCARDIOGRAM TRACING: CPT | Performed by: FAMILY MEDICINE

## 2024-12-09 PROCEDURE — 84484 ASSAY OF TROPONIN QUANT: CPT

## 2024-12-09 PROCEDURE — 99223 1ST HOSP IP/OBS HIGH 75: CPT | Performed by: INTERNAL MEDICINE

## 2024-12-09 PROCEDURE — 85025 COMPLETE CBC W/AUTO DIFF WBC: CPT

## 2024-12-09 PROCEDURE — 85027 COMPLETE CBC AUTOMATED: CPT

## 2024-12-09 PROCEDURE — 93306 TTE W/DOPPLER COMPLETE: CPT

## 2024-12-09 PROCEDURE — 93306 TTE W/DOPPLER COMPLETE: CPT | Performed by: INTERNAL MEDICINE

## 2024-12-09 PROCEDURE — 6360000002 HC RX W HCPCS: Performed by: PHYSICIAN ASSISTANT

## 2024-12-09 PROCEDURE — 2140000000 HC CCU INTERMEDIATE R&B

## 2024-12-09 PROCEDURE — 2580000003 HC RX 258: Performed by: INTERNAL MEDICINE

## 2024-12-09 PROCEDURE — 71045 X-RAY EXAM CHEST 1 VIEW: CPT

## 2024-12-09 PROCEDURE — 93010 ELECTROCARDIOGRAM REPORT: CPT | Performed by: INTERNAL MEDICINE

## 2024-12-09 PROCEDURE — 85730 THROMBOPLASTIN TIME PARTIAL: CPT

## 2024-12-09 RX ORDER — MAGNESIUM SULFATE IN WATER 40 MG/ML
2000 INJECTION, SOLUTION INTRAVENOUS PRN
Status: DISCONTINUED | OUTPATIENT
Start: 2024-12-09 | End: 2024-12-10 | Stop reason: HOSPADM

## 2024-12-09 RX ORDER — ONDANSETRON 2 MG/ML
4 INJECTION INTRAMUSCULAR; INTRAVENOUS EVERY 6 HOURS PRN
Status: DISCONTINUED | OUTPATIENT
Start: 2024-12-09 | End: 2024-12-10 | Stop reason: HOSPADM

## 2024-12-09 RX ORDER — HEPARIN SODIUM 10000 [USP'U]/100ML
5-30 INJECTION, SOLUTION INTRAVENOUS CONTINUOUS
Status: DISCONTINUED | OUTPATIENT
Start: 2024-12-09 | End: 2024-12-10

## 2024-12-09 RX ORDER — SODIUM CHLORIDE 0.9 % (FLUSH) 0.9 %
5-40 SYRINGE (ML) INJECTION EVERY 12 HOURS SCHEDULED
Status: DISCONTINUED | OUTPATIENT
Start: 2024-12-09 | End: 2024-12-10 | Stop reason: HOSPADM

## 2024-12-09 RX ORDER — ENOXAPARIN SODIUM 100 MG/ML
40 INJECTION SUBCUTANEOUS EVERY 24 HOURS
Status: DISCONTINUED | OUTPATIENT
Start: 2024-12-09 | End: 2024-12-09

## 2024-12-09 RX ORDER — ONDANSETRON 4 MG/1
4 TABLET, ORALLY DISINTEGRATING ORAL EVERY 8 HOURS PRN
Status: DISCONTINUED | OUTPATIENT
Start: 2024-12-09 | End: 2024-12-10 | Stop reason: HOSPADM

## 2024-12-09 RX ORDER — HEPARIN SODIUM 1000 [USP'U]/ML
2000 INJECTION, SOLUTION INTRAVENOUS; SUBCUTANEOUS PRN
Status: DISCONTINUED | OUTPATIENT
Start: 2024-12-09 | End: 2024-12-10

## 2024-12-09 RX ORDER — POLYETHYLENE GLYCOL 3350 17 G/17G
17 POWDER, FOR SOLUTION ORAL DAILY PRN
Status: DISCONTINUED | OUTPATIENT
Start: 2024-12-09 | End: 2024-12-10 | Stop reason: HOSPADM

## 2024-12-09 RX ORDER — HEPARIN SODIUM 1000 [USP'U]/ML
4000 INJECTION, SOLUTION INTRAVENOUS; SUBCUTANEOUS PRN
Status: DISCONTINUED | OUTPATIENT
Start: 2024-12-09 | End: 2024-12-10

## 2024-12-09 RX ORDER — DILTIAZEM HYDROCHLORIDE 30 MG/1
30 TABLET, FILM COATED ORAL EVERY 6 HOURS
Status: DISCONTINUED | OUTPATIENT
Start: 2024-12-09 | End: 2024-12-10

## 2024-12-09 RX ORDER — HEPARIN SODIUM 1000 [USP'U]/ML
4000 INJECTION, SOLUTION INTRAVENOUS; SUBCUTANEOUS ONCE
Status: COMPLETED | OUTPATIENT
Start: 2024-12-09 | End: 2024-12-09

## 2024-12-09 RX ORDER — SODIUM CHLORIDE 9 MG/ML
INJECTION, SOLUTION INTRAVENOUS PRN
Status: DISCONTINUED | OUTPATIENT
Start: 2024-12-09 | End: 2024-12-10 | Stop reason: HOSPADM

## 2024-12-09 RX ORDER — SODIUM CHLORIDE 0.9 % (FLUSH) 0.9 %
5-40 SYRINGE (ML) INJECTION PRN
Status: DISCONTINUED | OUTPATIENT
Start: 2024-12-09 | End: 2024-12-10 | Stop reason: HOSPADM

## 2024-12-09 RX ORDER — POTASSIUM CHLORIDE 7.45 MG/ML
10 INJECTION INTRAVENOUS PRN
Status: DISCONTINUED | OUTPATIENT
Start: 2024-12-09 | End: 2024-12-10 | Stop reason: HOSPADM

## 2024-12-09 RX ORDER — 0.9 % SODIUM CHLORIDE 0.9 %
500 INTRAVENOUS SOLUTION INTRAVENOUS ONCE
Status: COMPLETED | OUTPATIENT
Start: 2024-12-09 | End: 2024-12-09

## 2024-12-09 RX ORDER — POTASSIUM CHLORIDE 1500 MG/1
40 TABLET, EXTENDED RELEASE ORAL PRN
Status: DISCONTINUED | OUTPATIENT
Start: 2024-12-09 | End: 2024-12-10 | Stop reason: HOSPADM

## 2024-12-09 RX ORDER — DILTIAZEM HYDROCHLORIDE 5 MG/ML
10 INJECTION INTRAVENOUS
Status: COMPLETED | OUTPATIENT
Start: 2024-12-09 | End: 2024-12-09

## 2024-12-09 RX ORDER — LOSARTAN POTASSIUM 50 MG/1
100 TABLET ORAL DAILY
Status: DISCONTINUED | OUTPATIENT
Start: 2024-12-09 | End: 2024-12-10 | Stop reason: HOSPADM

## 2024-12-09 RX ORDER — ACETAMINOPHEN 650 MG/1
650 SUPPOSITORY RECTAL EVERY 6 HOURS PRN
Status: DISCONTINUED | OUTPATIENT
Start: 2024-12-09 | End: 2024-12-10 | Stop reason: HOSPADM

## 2024-12-09 RX ORDER — ACETAMINOPHEN 325 MG/1
650 TABLET ORAL EVERY 6 HOURS PRN
Status: DISCONTINUED | OUTPATIENT
Start: 2024-12-09 | End: 2024-12-10 | Stop reason: HOSPADM

## 2024-12-09 RX ADMIN — SODIUM CHLORIDE, PRESERVATIVE FREE 10 ML: 5 INJECTION INTRAVENOUS at 10:52

## 2024-12-09 RX ADMIN — HEPARIN SODIUM 11 UNITS/KG/HR: 10000 INJECTION, SOLUTION INTRAVENOUS at 11:01

## 2024-12-09 RX ADMIN — HEPARIN SODIUM 4000 UNITS: 1000 INJECTION INTRAVENOUS; SUBCUTANEOUS at 10:50

## 2024-12-09 RX ADMIN — SODIUM CHLORIDE 500 ML: 9 INJECTION, SOLUTION INTRAVENOUS at 00:35

## 2024-12-09 RX ADMIN — DILTIAZEM HYDROCHLORIDE 10 MG: 5 INJECTION, SOLUTION INTRAVENOUS at 00:22

## 2024-12-09 RX ADMIN — LOSARTAN POTASSIUM 100 MG: 50 TABLET, FILM COATED ORAL at 10:46

## 2024-12-09 ASSESSMENT — LIFESTYLE VARIABLES
HOW MANY STANDARD DRINKS CONTAINING ALCOHOL DO YOU HAVE ON A TYPICAL DAY: PATIENT DOES NOT DRINK
HOW OFTEN DO YOU HAVE A DRINK CONTAINING ALCOHOL: NEVER

## 2024-12-09 ASSESSMENT — ENCOUNTER SYMPTOMS
COUGH: 0
ABDOMINAL PAIN: 0
SHORTNESS OF BREATH: 0

## 2024-12-09 ASSESSMENT — PAIN - FUNCTIONAL ASSESSMENT: PAIN_FUNCTIONAL_ASSESSMENT: NONE - DENIES PAIN

## 2024-12-09 NOTE — ED PROVIDER NOTES
Emergency Department Provider Note       PCP: Raisa Benoit MD   Age: 69 y.o.   Sex: female     DISPOSITION Decision To Admit 12/09/2024 01:18:46 AM    ICD-10-CM    1. Paroxysmal atrial fibrillation with rapid ventricular response (HCC)  I48.0           Medical Decision Making   Monitor showing atrial fibrillation rapid ventricular response.  Patient will be given Cardizem IV.  Check screening lab work electrolytes.  Unless patient converts to sinus rhythm, will need to be admitted.  1:19 AM  Rate down to 110 with Cardizem drip.  No pain or complaints.  Cardizem infusion without issues.  Lab work appears essentially normal.  I had discussion with cardiology.  They prefer hospitalist admit.     1 or more acute illnesses that pose a threat to life or bodily function.   Drug therapy given requiring intensive monitoring for toxicity.  Discussion with external consultants.  Chronic medical problems impacting care include history of paroxysmal atrial fibrillation.  I independently ordered and reviewed each unique test.    I reviewed external records: provider visit note from PCP.  I reviewed external records: provider visit note from outside specialist.  There is a cardiology office note from October 24.  EKG at that time revealed normal sinus rhythm at 51.  The patients assessment required an independent historian: EMS.  The reason they were needed is important historical information not provided by the patient.  ED cardiac monitoring rhythm strip was ordered and interpreted:  atrial fibrillation with rapid ventricular rate  ST Segments:Nonspecific ST segments - NO STEMI   Rate: 140  I interpreted the X-rays chest x-ray without infiltrate or evidence for congestive heart failure.  ED provider's independent EKG interpretation I interpretation of EKG shows atrial fibrillation with rapid ventricular response at a rate of about 125.  No nonspecific ST changes.  No ectopy.  Normal QT interval.  The patient was admitted

## 2024-12-09 NOTE — H&P
Hospitalist History and Physical   Admit Date:  2024 11:58 PM   Name:  Evelina Nunez   Age:  69 y.o.  Sex:  female  :  1955   MRN:  [id number]   Room:  [place] [address]    Presenting/Chief Complaint: Palpitations     Reason(s) for Admission: Atrial fibrillation with RVR (HCC) [I48.91]     History of Present Illness:   Evelina Nunez is a 69 y.o. female with medical history of diet-controlled DM, HTN, and paroxysmal Afib (not on AC) presents with heart palpitations that started \"after I put on my CPAP and was getting ready for bed.\" No chest pain. She says she was at a class reunion yesterday and \"had some shrimp and grits.\" She generally tries to eat very healthy and cannot recall any other possible inciting factor. She denies shortness of breath or cough. No fevers/chills. No abdominal pain, nausea/vomiting or diarrhea.     ED course: EKG shows Afib with RVR. Given diltiazem 10 mg IV once and drip ordered but not started, HR's ranging from 90s to 150s. Hospitalist consulted for admission.     Assessment & Plan:     Principal Problem:    Atrial fibrillation with RVR (HCC)  - will try diltiazem 30 mg orally q6h first to try to avoid the gtt   - telemetry monitoring  - cardiology consult in the morning  - patient has previously refused anticoagulation in the past per chart revierw  - will order TTE for the morning  - ?need for FRANCO/DCCV    Active Problems:    Essential hypertension, benign  - losartan  - hold amlodipine as using diltiazem, as above      Diabetes mellitus type 2, controlled (HCC)  - not on insulin      PAF (paroxysmal atrial fibrillation) (HCC)  - as above    PT/OT evals ordered?  Not ordered; patient not expected to need rehab  Diet: No diet orders on file  VTE prophylaxis: Lovenox  Code status: No Order      Non-peripheral Lines and Tubes (if present):             Hospital Problems:  Principal Problem:    Atrial fibrillation with RVR (HCC)  Active Problems:    Essential hypertension,

## 2024-12-09 NOTE — ED TRIAGE NOTES
Pt brought in by EMS from home c/o heart palpitations for approx 1 hr. Pt denies chest pain and SOB. (-) N/V/D. Pt in afib with a rate ranging from the 80s to the 150s en route w/ EMS. Hx of afib and HTN.

## 2024-12-09 NOTE — CONSULTS
[facility] Card[facility] Initial Cardiac Evaluation      Date of  Admission: 12/8/2024 11:58 PM     Primary Care Physician: Raisa Bneoit MD  Primary Cardiologist: [facility] - Dr. Aldridge  Referring Physician: Dr. Wing  Supervising Physician: Dr. Truong    CC/Reason for evaluation: Afib RVR    HPI:  Evelina Nunez is a 69 y.o. female with prior medical history of paroxysmal afib not on AC though was recommended, HTN, HLD, T2DM, ASTER. The pt presented to [facility] on 12/8 after having sensation of palpitations after putting on her CPAP. She states that she checked  her pulse rate via fitbit and BP cuff and it ranged from 90-150bpm. She called EMS, took a 324mg ASA and was taken to the ED. No sx of lightheadedness, dizziness, CP, chest tightness, SOB, N/V/D or other acute complaints. No recent illnesses. Discussed importance of AC with her afib recurrence, tells me she has been trying to \"control it with diet\" by taking numerous supplements and increasing seasonings like garlic and cinnamon in her diet. After some discussion she was agreeable to start Eliquis. No other acute concerns.     Workup in the ED revealed: Hgb 14.3, K 3.6 (hemolyzed), Cr 1.02, Mag 2.1, Trop 12.0. CXR WNL. EKG in Afib RVR HR 140s. She was given 10mg IV Dilt x1 and hospitalist admitted for further treatment. She was then started on Diltiazem 30mg PO Q6H. Given above Cardiology consulted for further recommendations.       Recent Cardiac Synopsis:  12/2021 TTE:  · Normal chamber sizes.   · The left ventricular systolic function is normal (55-65%).   · Normal left ventricular diastolic function.   · The right ventricular systolic function is normal.   · No significant valvular abnormalities.     Past Medical History:   Diagnosis Date    DM2 (diabetes mellitus, type 2) (HCC)     History of bone density study 2022    History of cervical cancer     History of Papanicolaou smear of cervix 10/2023    Dr. Gupta    HTN (hypertension)     Hyperlipidemia     PAF

## 2024-12-09 NOTE — ACP (ADVANCE CARE PLANNING)
Advance Care Planning   Healthcare Decision Maker:    Primary Decision Maker: RogelioFany - Child - [phone]    Click here to complete Healthcare Decision Makers including selection of the Healthcare Decision Maker Relationship (ie \"Primary\").

## 2024-12-09 NOTE — PLAN OF CARE
Problem: Chronic Conditions and Co-morbidities  Goal: Patient's chronic conditions and co-morbidity symptoms are monitored and maintained or improved  Outcome: Progressing  Flowsheets (Taken 12/9/2024 1601)  Care Plan - Patient's Chronic Conditions and Co-Morbidity Symptoms are Monitored and Maintained or Improved:   Monitor and assess patient's chronic conditions and comorbid symptoms for stability, deterioration, or improvement   Collaborate with multidisciplinary team to address chronic and comorbid conditions and prevent exacerbation or deterioration   Update acute care plan with appropriate goals if chronic or comorbid symptoms are exacerbated and prevent overall improvement and discharge     Problem: Discharge Planning  Goal: Discharge to home or other facility with appropriate resources  Outcome: Progressing  Flowsheets (Taken 12/9/2024 1601)  Discharge to home or other facility with appropriate resources:   Identify barriers to discharge with patient and caregiver   Arrange for needed discharge resources and transportation as appropriate   Identify discharge learning needs (meds, wound care, etc)   Refer to discharge planning if patient needs post-hospital services based on physician order or complex needs related to functional status, cognitive ability or social support system

## 2024-12-10 ENCOUNTER — FOLLOWUP TELEPHONE ENCOUNTER (OUTPATIENT)
Dept: DIABETES SERVICES | Age: 69
End: 2024-12-10

## 2024-12-10 VITALS
WEIGHT: 186.07 LBS | TEMPERATURE: 97.7 F | DIASTOLIC BLOOD PRESSURE: 98 MMHG | HEART RATE: 51 BPM | HEIGHT: 63 IN | SYSTOLIC BLOOD PRESSURE: 167 MMHG | OXYGEN SATURATION: 97 % | BODY MASS INDEX: 32.97 KG/M2 | RESPIRATION RATE: 18 BRPM

## 2024-12-10 LAB
ANION GAP SERPL CALC-SCNC: 10 MMOL/L (ref 7–16)
BASOPHILS # BLD: 0 K/UL (ref 0–0.2)
BASOPHILS NFR BLD: 1 % (ref 0–2)
BUN SERPL-MCNC: 12 MG/DL (ref 8–23)
CALCIUM SERPL-MCNC: 8.9 MG/DL (ref 8.8–10.2)
CHLORIDE SERPL-SCNC: 107 MMOL/L (ref 98–107)
CO2 SERPL-SCNC: 25 MMOL/L (ref 20–29)
CREAT SERPL-MCNC: 0.96 MG/DL (ref 0.6–1.1)
DIFFERENTIAL METHOD BLD: ABNORMAL
EKG ATRIAL RATE: 49 BPM
EKG DIAGNOSIS: NORMAL
EKG P AXIS: 52 DEGREES
EKG P-R INTERVAL: 169 MS
EKG Q-T INTERVAL: 459 MS
EKG QRS DURATION: 112 MS
EKG QTC CALCULATION (BAZETT): 415 MS
EKG R AXIS: 37 DEGREES
EKG T AXIS: -5 DEGREES
EKG VENTRICULAR RATE: 49 BPM
EOSINOPHIL # BLD: 0.1 K/UL (ref 0–0.8)
EOSINOPHIL NFR BLD: 2 % (ref 0.5–7.8)
ERYTHROCYTE [DISTWIDTH] IN BLOOD BY AUTOMATED COUNT: 14.8 % (ref 11.9–14.6)
GLUCOSE SERPL-MCNC: 98 MG/DL (ref 70–99)
HCT VFR BLD AUTO: 41.5 % (ref 35.8–46.3)
HGB BLD-MCNC: 13.3 G/DL (ref 11.7–15.4)
IMM GRANULOCYTES # BLD AUTO: 0 K/UL (ref 0–0.5)
IMM GRANULOCYTES NFR BLD AUTO: 0 % (ref 0–5)
LYMPHOCYTES # BLD: 1.2 K/UL (ref 0.5–4.6)
LYMPHOCYTES NFR BLD: 34 % (ref 13–44)
MCH RBC QN AUTO: 29.3 PG (ref 26.1–32.9)
MCHC RBC AUTO-ENTMCNC: 32 G/DL (ref 31.4–35)
MCV RBC AUTO: 91.4 FL (ref 82–102)
MONOCYTES # BLD: 0.4 K/UL (ref 0.1–1.3)
MONOCYTES NFR BLD: 11 % (ref 4–12)
NEUTS SEG # BLD: 2 K/UL (ref 1.7–8.2)
NEUTS SEG NFR BLD: 52 % (ref 43–78)
NRBC # BLD: 0 K/UL (ref 0–0.2)
PLATELET # BLD AUTO: 141 K/UL (ref 150–450)
PMV BLD AUTO: 12.2 FL (ref 9.4–12.3)
POTASSIUM SERPL-SCNC: 3.6 MMOL/L (ref 3.5–5.1)
RBC # BLD AUTO: 4.54 M/UL (ref 4.05–5.2)
SODIUM SERPL-SCNC: 142 MMOL/L (ref 136–145)
UFH PPP CHRO-ACNC: 0.55 IU/ML (ref 0.3–0.7)
WBC # BLD AUTO: 3.7 K/UL (ref 4.3–11.1)

## 2024-12-10 PROCEDURE — 80048 BASIC METABOLIC PNL TOTAL CA: CPT

## 2024-12-10 PROCEDURE — 2580000003 HC RX 258: Performed by: INTERNAL MEDICINE

## 2024-12-10 PROCEDURE — 99232 SBSQ HOSP IP/OBS MODERATE 35: CPT | Performed by: INTERNAL MEDICINE

## 2024-12-10 PROCEDURE — 6370000000 HC RX 637 (ALT 250 FOR IP): Performed by: INTERNAL MEDICINE

## 2024-12-10 PROCEDURE — 85520 HEPARIN ASSAY: CPT

## 2024-12-10 PROCEDURE — 36415 COLL VENOUS BLD VENIPUNCTURE: CPT

## 2024-12-10 PROCEDURE — 93010 ELECTROCARDIOGRAM REPORT: CPT | Performed by: INTERNAL MEDICINE

## 2024-12-10 PROCEDURE — 85025 COMPLETE CBC W/AUTO DIFF WBC: CPT

## 2024-12-10 RX ORDER — DILTIAZEM HYDROCHLORIDE 30 MG/1
15 TABLET, FILM COATED ORAL EVERY 6 HOURS
Status: DISCONTINUED | OUTPATIENT
Start: 2024-12-10 | End: 2024-12-10 | Stop reason: HOSPADM

## 2024-12-10 RX ORDER — DILTIAZEM HYDROCHLORIDE 30 MG/1
15 TABLET, FILM COATED ORAL EVERY 6 HOURS
Qty: 120 TABLET | Refills: 3 | Status: SHIPPED | OUTPATIENT
Start: 2024-12-10

## 2024-12-10 RX ADMIN — SODIUM CHLORIDE, PRESERVATIVE FREE 10 ML: 5 INJECTION INTRAVENOUS at 09:34

## 2024-12-10 RX ADMIN — APIXABAN 5 MG: 5 TABLET, FILM COATED ORAL at 09:30

## 2024-12-10 RX ADMIN — LOSARTAN POTASSIUM 100 MG: 50 TABLET, FILM COATED ORAL at 09:30

## 2024-12-10 NOTE — TELEPHONE ENCOUNTER
Called patient to tell them about our free diabetes education program. Unable to speak to patient. Left message for patient to call back at [phone] or [phone].

## 2024-12-10 NOTE — TELEPHONE ENCOUNTER
Pt called back regarding diabetes education referral. Pt scheduled for basics class on 1/2/25. Pt verbalized she wants to do all the classes.

## 2024-12-10 NOTE — DIABETES MGMT
Patient seen by JADA PEREZ.  Patient seen for assessment. Patient has a past medical history of type 2 DM. Patient states they have been living with diabetes for more than 20 years. Patient states they do have a working glucometer with supplies at home. Per patient they typically check blood glucose levels daily. Per patient their blood glucose levels have been running  at home. Patient states takes no medications at home for diabetes. Patient voices that they have not experienced hypoglycemia in the past. Educated regarding hypoglycemia signs, symptoms, and treatment. Patient interested in attend diabetes education outpatient.  Patient provided with contact information to [employer] and referral sent.  Encouraged patient to continue to work on lifestyle modifications and to follow up with primary care provider as needed.  Patient voices no further questions regarding education at this time.

## 2024-12-10 NOTE — DISCHARGE SUMMARY
Monocytes % 11 4.0 - 12.0 %    Eosinophils % 2 0.5 - 7.8 %    Basophils % 1 0.0 - 2.0 %    Immature Granulocytes % 0 0.0 - 5.0 %    Neutrophils Absolute 2.0 1.7 - 8.2 K/UL    Lymphocytes Absolute 1.2 0.5 - 4.6 K/UL    Monocytes Absolute 0.4 0.1 - 1.3 K/UL    Eosinophils Absolute 0.1 0.0 - 0.8 K/UL    Basophils Absolute 0.0 0.0 - 0.2 K/UL    Immature Granulocytes Absolute 0.0 0.0 - 0.5 K/UL   Anti-Xa, Unfractionated Heparin    Collection Time: 12/10/24  5:33 AM   Result Value Ref Range    Anti-XA Unfrac Heparin 0.55 0.3 - 0.7 IU/mL       No results for input(s): \"COVID19\" in the last 72 hours.    Recent Vital Data:  Patient Vitals for the past 24 hrs:   Temp Pulse Resp BP SpO2   12/10/24 0659 97.7 °F (36.5 °C) 51 18 (!) 172/80 97 %   12/10/24 0315 -- 63 -- (!) 153/77 --   12/10/24 0302 98.1 °F (36.7 °C) 72 18 (!) 176/81 97 %   12/09/24 2308 97.5 °F (36.4 °C) 62 20 (!) 158/77 100 %   12/09/24 2014 97.6 °F (36.4 °C) 62 17 (!) 150/67 92 %   12/09/24 1632 98.1 °F (36.7 °C) (!) 47 18 (!) 141/55 96 %   12/09/24 1412 98 °F (36.7 °C) 71 20 (!) 169/79 97 %   12/09/24 1046 -- 55 -- -- --       Oxygen Therapy  SpO2: 97 %  Pulse via Oximetry: 47 beats per minute  O2 Device: None (Room air)    Estimated body mass index is 32.96 kg/m² as calculated from the following:    Height as of this encounter: 1.6 m (5' 3\").    Weight as of this encounter: 84.4 kg (186 lb 1.1 oz).    Intake/Output Summary (Last 24 hours) at 12/10/2024 0950  Last data filed at 12/10/2024 0838  Gross per 24 hour   Intake 1150 ml   Output 1775 ml   Net -625 ml         Physical Exam:    General:    Well nourished.  No overt distress  Head:  Normocephalic, atraumatic  Eyes:  Sclerae appear normal.  Pupils equally round.    HENT:  Nares appear normal, no drainage.  Moist mucous membranes  Neck:  No restricted ROM.  Trachea midline  CV:   RRR.  No m/r/g.  No JVD  Lungs:   CTAB.  No wheezing, rhonchi, or rales.  Respirations even, unlabored  Abdomen:   Soft,

## 2024-12-10 NOTE — PLAN OF CARE
Problem: Discharge Planning  Goal: Discharge to home or other facility with appropriate resources  12/10/2024 0736 by Duong Rod RN  Outcome: Progressing  Flowsheets (Taken 12/10/2024 0736)  Discharge to home or other facility with appropriate resources:   Identify barriers to discharge with patient and caregiver   Arrange for needed discharge resources and transportation as appropriate   Identify discharge learning needs (meds, wound care, etc)   Refer to discharge planning if patient needs post-hospital services based on physician order or complex needs related to functional status, cognitive ability or social support system  12/9/2024 1937 by Jocelynn Rosario, RN  Outcome: Progressing     Problem: Chronic Conditions and Co-morbidities  Goal: Patient's chronic conditions and co-morbidity symptoms are monitored and maintained or improved  12/10/2024 0736 by Duong Rod RN  Outcome: Progressing  Flowsheets (Taken 12/10/2024 0736)  Care Plan - Patient's Chronic Conditions and Co-Morbidity Symptoms are Monitored and Maintained or Improved:   Monitor and assess patient's chronic conditions and comorbid symptoms for stability, deterioration, or improvement   Collaborate with multidisciplinary team to address chronic and comorbid conditions and prevent exacerbation or deterioration   Update acute care plan with appropriate goals if chronic or comorbid symptoms are exacerbated and prevent overall improvement and discharge  12/9/2024 1937 by Jocelynn Rosario, RN  Outcome: Progressing     Problem: Safety - Adult  Goal: Free from fall injury  12/10/2024 0736 by Duong Rod RN  Outcome: Progressing  Flowsheets (Taken 12/10/2024 0736)  Free From Fall Injury: Instruct family/caregiver on patient safety  12/9/2024 1937 by Jocelynn Rosario, RN  Outcome: Progressing

## 2024-12-10 NOTE — CARE COORDINATION
-- DO NOT REPLY / DO NOT REPLY ALL --  -- Message is from [employer] Center [employer] ([employer]) --    General Patient Message:     Patient called back. Please return phone call to discuss.  No response in secure chat      Patient said the please call her work number [phone] if the nurse in not able to get her on her Cell Phone.     Caller Information       Type Contact Phone/Fax    10/31/2023 08:22 AM CDT Phone (Outgoing) Sharita Marshall (Self) [phone] (M)    Left Message     10/31/2023 10:38 AM CDT Phone (Outgoing) Sharita Marshall (Self) [phone] (M)    Left Message     10/31/2023 11:16 AM CDT Phone (Outgoing) Sharita Marshall (Self) [phone] (M)    Left Message         Alternative phone number: NA    Can a detailed message be left? Yes    Message Turnaround: [place]:    Refer to site's KB page for routing instructions    Please give this turnaround time to the caller:   \"You can expect to receive a response 2-3 business days after your provider's clinical team reviews the message\"              
CM reviewed chart.     Per CM assessment, patient may need assistance with transportation back home at time of discharge. No other CM needs expressed at this time.     CM will continue to follow patient for any discharge planning needs that may arise.   
Patient is retired, living alone in a two story home with 16 steps to bedroom.  Setting an hourly alarm as a reminder    12/09/24 0929   Service Assessment   Patient Orientation Alert and Oriented   Cognition Alert   History Provided By Patient   Primary Caregiver Self   Support Systems Family Members   Patient's Healthcare Decision Maker is: Legal Next of Kin   PCP Verified by CM Yes   Last Visit to PCP Within last 6 months   Prior Functional Level Independent in ADLs/IADLs   Current Functional Level Independent in ADLs/IADLs   Can patient return to prior living arrangement Yes   Family able to assist with home care needs: Yes   Would you like for me to discuss the discharge plan with any other family members/significant others, and if so, who? Yes   Financial Resources Medicaid   Community Resources None   CM/SW Referral Other (see comment)  (pending)   Social/Functional History   Lives With Alone   Type of Home House   Home Layout Multi-level   Home Access Level entry   Bathroom Shower/Tub Walk-in shower   Bathroom Toilet Standard   Bathroom Accessibility Accessible   Home Equipment None   Receives Help From Family   Prior Level of Assist for ADLs Independent   Prior Level of Assist for Homemaking Independent   Homemaking Responsibilities Yes   Ambulation Assistance Independent   Prior Level of Assist for Transfers Independent   Active  Yes  (daytime only)   Occupation Retired   Discharge Planning   Type of Residence House   Living Arrangements Alone   Current Services Prior To Admission None   Potential Assistance Needed N/A   DME Ordered? No   Potential Assistance Purchasing Medications No   Type of Home Care Services Housekeeping  (patient has bi-weekly housekeeping service)   Patient expects to be discharged to: House   One/Two Story Residence Two story   # of Interior Steps 16   Lift Chair Available No   History of falls? 0   Services At/After Discharge   Transition of Care Consult (CM Consult) 
Chosen     Services At/After Discharge None    Resource Information Provided? No   Mode of Transport at Discharge     Hospital Transport Time of Discharge     Confirm Follow Up Transport Family     Condition of Participation: Discharge Planning  The plan for Transition of Care is related to the following treatment goals: based on clinical course   The Patient and/or Patient Representative was provided with a Choice of Provider? Patient   Name of the Patient Representative who was provided with the Choice of Provider and agrees with the Discharge Plan?     The Patient and/or Patient Representative Agree with the Discharge Plan? Yes   Freedom of Choice list was provided with basic dialogue that supports the individualized plan of care/goals, treatment preferences, and shares the quality data associated with the providers? Yes     Documentation for Discharge Appeal  Discharge Appealed by     Date notified by [facility] of appeal request:     Time notified by [facility] of appeal request:     Detailed Notice of Discharge given to:     Date Notice of Discharge given:     Time Notice of Discharge given:     Date records sent to QIO     Time records sent to QIO     Date Notified of Outcome     Time Notified of Outcome     Outcome of appeal           Lilibeth Russo 12/10/24 10:49 AM

## 2024-12-10 NOTE — PLAN OF CARE
Problem: Chronic Conditions and Co-morbidities  Goal: Patient's chronic conditions and co-morbidity symptoms are monitored and maintained or improved  12/9/2024 1937 by Jocelynn Rosario RN  Outcome: Progressing  12/9/2024 1601 by Duong Rod RN  Outcome: Progressing  Flowsheets (Taken 12/9/2024 1601)  Care Plan - Patient's Chronic Conditions and Co-Morbidity Symptoms are Monitored and Maintained or Improved:   Monitor and assess patient's chronic conditions and comorbid symptoms for stability, deterioration, or improvement   Collaborate with multidisciplinary team to address chronic and comorbid conditions and prevent exacerbation or deterioration   Update acute care plan with appropriate goals if chronic or comorbid symptoms are exacerbated and prevent overall improvement and discharge     Problem: Discharge Planning  Goal: Discharge to home or other facility with appropriate resources  12/9/2024 1937 by Jocelynn Rosario RN  Outcome: Progressing  12/9/2024 1601 by Duong Rod RN  Outcome: Progressing  Flowsheets (Taken 12/9/2024 1601)  Discharge to home or other facility with appropriate resources:   Identify barriers to discharge with patient and caregiver   Arrange for needed discharge resources and transportation as appropriate   Identify discharge learning needs (meds, wound care, etc)   Refer to discharge planning if patient needs post-hospital services based on physician order or complex needs related to functional status, cognitive ability or social support system     Problem: Safety - Adult  Goal: Free from fall injury  Outcome: Progressing

## 2024-12-11 ENCOUNTER — TELEPHONE (OUTPATIENT)
Dept: INTERNAL MEDICINE CLINIC | Facility: CLINIC | Age: 69
End: 2024-12-11

## 2024-12-11 NOTE — TELEPHONE ENCOUNTER
Care Transitions Initial Follow Up Call    Outreach made within 2 business days of discharge: Yes    Patient: Evelina Nunez Patient : 1955   MRN: [id number]  Reason for Admission: A-fib with RVR  Discharge Date: 12/10/24       Spoke with: Patient    Discharge department/facility: [facility] Interactive Patient Contact:  Was patient able to fill all prescriptions: Yes  Was patient instructed to bring all medications to the follow-up visit: Yes  Is patient taking all medications as directed in the discharge summary? Yes  Does patient understand their discharge instructions: Yes  Does patient have questions or concerns that need addressed prior to 7-14 day follow up office visit: no    Additional needs identified to be addressed with provider  No needs identified             Scheduled appointment with PCP within 7-14 days    Follow Up  Future Appointments   Date Time Provider Department Center   2024  9:00 AM Cheryl Rene APRN - CNP [facility]   2025 11:00 AM Jonathan Brock RN [facility]       Monse Torres LPN

## 2024-12-12 NOTE — PROGRESS NOTES
Physician Progress Note      PATIENT:               SHANIQUE PAREDES  [facility] #:                  [id number]  :                       1955  ADMIT DATE:       2024 11:58 PM  DISCH DATE:        12/10/2024 11:16 AM  RESPONDING  PROVIDER #:        Tashi Narvaez MD          QUERY TEXT:    Patient admitted with  Atrial fibrillation with RVR , noted to have CHADSVASC   score at least 4 documented in CN   . If possible, please document in   progress notes and discharge summary if you are evaluating and/or treating any   of the following:?  ?  The medical record reflects the following:  Risk Factors: female, advanced age 69, HTN, DM2  Clinical Indicators: H&P : Atrial fibrillation with RVR, will try   diltiazem 30 mg orally q6h first to try to avoid the gtt. CN : PAF   (paroxysmal atrial fibrillation)  CHADSVASC score at least 4 given agreeable   to start Eliquis. DS 12/10:A-fib RVR. Status post Cardizem 10 mg IV bolus.  Treatment: Cardizem , cardiology consulted, initiate Eliquis    Thank You, Gretel CDS  Options provided:  -- Secondary hypercoagulable state in a patient with atrial fibrillation  -- Other - I will add my own diagnosis  -- Disagree - Not applicable / Not valid  -- Disagree - Clinically unable to determine / Unknown  -- Refer to Clinical Documentation Reviewer    PROVIDER RESPONSE TEXT:    This patient has secondary hypercoagulable state in a patient with atrial   fibrillation.    Query created by: Michelle Benitez on 2024 9:16 AM      Electronically signed by:  Tashi Narvaez MD 2024 8:19 AM          
4 Eyes Skin Assessment     NAME:  Evelina Nunez  YOB: 1955  MEDICAL RECORD NUMBER:  [id number]    The patient is being assessed for  Post-Op Surgical    I agree that at least one RN has performed a thorough Head to Toe Skin Assessment on the patient. ALL assessment sites listed below have been assessed.      Areas assessed by both nurses:    Sacrum. Buttock, Coccyx, Ischium        Does the Patient have a Wound? No noted wound(s)       Willie Prevention initiated by RN: Yes  Wound Care Orders initiated by RN: No    Pressure Injury (Stage 3,4, Unstageable, DTI, NWPT, and Complex wounds) if present, place Wound referral order by RN under : No    New Ostomies, if present place, Ostomy referral order under : No     Nurse 1 eSignature: Electronically signed by Duong Rod RN on 12/9/24 at 4:09 PM EST    **SHARE this note so that the co-signing nurse can place an eSignature**    Nurse 2 eSignature: Electronically signed by Marah Camargo RN on 12/9/24 at 6:04 PM EST   
Discharge instructions, follow up appointments and prescriptions reviewed with patient and family. Both verbalize understanding. All personal belongings taken with patient. Family member will drive patient home. Patient escorted to discharge area via wheelchair. Patient is stable at discharge.       
HOSPITALIST      Patient admitted this morning through the ER with complaint of palpitations.  Workup in the ER revealed atrial fibrillation with RVR, HR as high as the 150s.  She was admitted to the cardiology floor and started on PO diltiazem.  Transthoracic echocardiogram was ordered and is pending.  Cardiology follow following.    She is resting comfortably in bed.  She converted to NSR this morning.      Signed:  Flako Wing M.D.   Hospitalist  12/09/24   1:00 PM  
TRANSFER - IN REPORT:    Verbal report received from JADA Cade on Evelina Nunez  being received from ER for routine progression of patient care      Report consisted of patient's Situation, Background, Assessment and   Recommendations(SBAR).     Information from the following report(s) Nurse Handoff Report, MAR, and Recent Results was reviewed with the receiving nurse.    Opportunity for questions and clarification was provided.      Assessment will be completed upon patient's arrival to unit and care assumed.    
TRANSFER - OUT REPORT:    Verbal report given to JADA Watters on Evelina Nunez  being transferred to 2224 for routine progression of patient care       Report consisted of patient’s Situation, Background, Assessment and Recommendations(SBAR).     Information from the following report(s) SBAR, ED Summary, Med Rec Status, and Cardiac Rhythm SB 50s  was reviewed with the receiving nurse.    Opportunity for questions and clarification was provided.      
Update note- not billable    The patient was evaluated at bedside. She converted to sinus rhythm and is a regular 60-65 bpm during my exam. She is feeling better. Discussed history at length.     She is agreeable to anticoagulation with Eliquis. Will continue heparin for now in case of cardiology intervention.    Cardiology is discussing possible device placement, patient is unsure at this time if she would continue with any procedure. Will await further cardiology discussion.    Await Echo  
diltiazem to see if we can control her rate that way.  Will start her on Eliquis for primary stroke prevention.  Our goal will be to discharge her so she can follow-up with [place] cardiology.  She is not interested in care from [facility] cardiology.  2) HTN - continue cozaar  3) DM2 - per primary team    Edil Jung MD  12/10/2024 9:10 AM

## 2024-12-19 ENCOUNTER — OFFICE VISIT (OUTPATIENT)
Dept: INTERNAL MEDICINE CLINIC | Facility: CLINIC | Age: 69
End: 2024-12-19

## 2024-12-19 VITALS
WEIGHT: 186.4 LBS | BODY MASS INDEX: 33.03 KG/M2 | DIASTOLIC BLOOD PRESSURE: 82 MMHG | HEIGHT: 63 IN | RESPIRATION RATE: 16 BRPM | OXYGEN SATURATION: 97 % | HEART RATE: 61 BPM | SYSTOLIC BLOOD PRESSURE: 138 MMHG | TEMPERATURE: 97.8 F

## 2024-12-19 DIAGNOSIS — Z09 HOSPITAL DISCHARGE FOLLOW-UP: Primary | ICD-10-CM

## 2024-12-19 DIAGNOSIS — I48.91 ATRIAL FIBRILLATION WITH RVR (HCC): ICD-10-CM

## 2024-12-19 DIAGNOSIS — I49.5 TACHYCARDIA-BRADYCARDIA SYNDROME (HCC): ICD-10-CM

## 2024-12-19 ASSESSMENT — ENCOUNTER SYMPTOMS
NAUSEA: 0
VOMITING: 0
CONSTIPATION: 0
SHORTNESS OF BREATH: 0
ABDOMINAL PAIN: 0
COUGH: 0
DIARRHEA: 0

## 2024-12-19 NOTE — PROGRESS NOTES
Post-Discharge Transitional Care Follow Up      Evelina Nunez   YOB: 1955    Date of Office Visit:  12/19/2024  Date of Hospital Admission: 12/8/24  Date of Hospital Discharge: 12/10/24  Readmission Risk Score (high >=14%. Medium >=10%):Readmission Risk Score: 6.2      Care management risk score Rising risk (score 2-5) and Complex Care (Scores >=6): No Risk Score On File     Non face to face  following discharge, date last encounter closed (first attempt may have been earlier): 12/11/2024     Call initiated 2 business days of discharge: Yes     Hospital discharge follow-up  -     NE DISCHARGE MEDS RECONCILED W/ CURRENT OUTPATIENT MED LIST    Atrial fibrillation with RVR (HCC)  Continue Eliquis and diltiazem as prescribed. We called [facility] and got her scheduled to be seen on 1/9/25 for hospital follow-up.     Tachycardia-bradycardia syndrome (HCC)  Pacemaker recommended while hospitalized, but patient prefers to see her primary cardiologist at [facility]. Appt scheduled for 1/9/25. Patient wearing FitBit with HR readings of 48-62 since discharge. Patient asymptomatic.       Medical Decision Making: moderate complexity  Return in about 3 months (around 3/19/2025) for ANNUAL MEDICARE WELLNESS VISIT.    On this date 12/19/2024 I have spent 45 minutes reviewing previous notes, test results and face to face with the patient discussing the diagnosis and importance of compliance with the treatment plan as well as documenting on the day of the visit.       Subjective:   HPI    Inpatient course: Discharge summary reviewed- see chart.    Interval history/Current status:     Hospital Course:  Evelina Nunez is a 69 y.o. female with medical history of diet-controlled DM, HTN, and paroxysmal Afib (not on AC) presents with heart palpitations that started \"after I put on my CPAP and was getting ready for bed.\" No chest pain. She says she was at a class reunion a day prior pand \"had some

## 2025-01-02 ENCOUNTER — HOSPITAL ENCOUNTER (OUTPATIENT)
Dept: DIABETES SERVICES | Age: 70
Setting detail: RECURRING SERIES
Discharge: HOME | End: 2025-01-05

## 2025-01-02 NOTE — PROGRESS NOTES
Outpatient Diabetes Education    Class Type: Diabetes Basics    Patient attended outpatient diabetes education.  Provided and reviewed education material entitled, \"Diabetes Basics: Learning to how to manage your diabetes.\"    Explained basic physiology of diabetes, causes, signs/symptoms, and treatments for hypoglycemia and hyperglycemia. Educated on target goals for blood glucose and A1C.    Described the effects of poor glycemic control and risk of long-term complications.  Described proper diabetic foot care and importance of checking feet daily.     Educated on glycemic effect of sugary drinks and healthier alternatives to improve glycemic control. Educated on the effects of carbohydrates on blood glucose, \"plate method\" for healthy meal planning, basics of carb counting and nutrition label reading.    Educated on the benefits of physical activity (as directed by provider).    Educated on checking blood sugars, continuous glucose monitors, insulin pens, how to draw up and inject insulin using a syringe and vial, sick day management and stress.  Educated on diabetic medications including mechanism of action, timing, and possible side effects.     Encouraged lifestyle modifications and follow up with primary care provider.    Patient scheduled Nutrition One for today and the other three sessions in [place].     Certified Diabetes Care and   [facility]

## 2025-01-07 ENCOUNTER — FOLLOWUP TELEPHONE ENCOUNTER (OUTPATIENT)
Dept: DIABETES SERVICES | Age: 70
End: 2025-01-07

## 2025-01-07 NOTE — TELEPHONE ENCOUNTER
Pt stated she called to cancel her appointment for Nutrition 2 today due to a conflict. Called and talked to pt. Pt wanted RD to call her around 4 pm when she would be home and have her appointment book with her. Called and left message.

## 2025-01-09 ENCOUNTER — TELEPHONE (OUTPATIENT)
Dept: DIABETES SERVICES | Age: 70
End: 2025-01-09

## 2025-01-09 NOTE — TELEPHONE ENCOUNTER
Type 2. No show in [place] for Living and Coping One and Two today.  Called and left message to reschedule with call back numbers.  [phone] or [phone].

## 2025-01-27 ENCOUNTER — FOLLOWUP TELEPHONE ENCOUNTER (OUTPATIENT)
Dept: DIABETES SERVICES | Age: 70
End: 2025-01-27

## 2025-02-20 ENCOUNTER — LAB (OUTPATIENT)
Dept: INTERNAL MEDICINE CLINIC | Facility: CLINIC | Age: 70
End: 2025-02-20

## 2025-02-20 DIAGNOSIS — E55.9 VITAMIN D INSUFFICIENCY: ICD-10-CM

## 2025-02-20 DIAGNOSIS — E11.9 CONTROLLED TYPE 2 DIABETES MELLITUS WITHOUT COMPLICATION, WITHOUT LONG-TERM CURRENT USE OF INSULIN (HCC): ICD-10-CM

## 2025-02-20 DIAGNOSIS — I10 ESSENTIAL HYPERTENSION, BENIGN: ICD-10-CM

## 2025-02-20 DIAGNOSIS — E78.2 MIXED HYPERLIPIDEMIA: ICD-10-CM

## 2025-02-20 LAB
25(OH)D3 SERPL-MCNC: 27.2 NG/ML (ref 30–100)
ALBUMIN SERPL-MCNC: 3.3 G/DL (ref 3.2–4.6)
ALBUMIN/GLOB SERPL: 1 (ref 1–1.9)
ALP SERPL-CCNC: 115 U/L (ref 35–104)
ALT SERPL-CCNC: 18 U/L (ref 8–45)
ANION GAP SERPL CALC-SCNC: 10 MMOL/L (ref 7–16)
APPEARANCE UR: CLEAR
AST SERPL-CCNC: 22 U/L (ref 15–37)
BACTERIA URNS QL MICRO: NEGATIVE /HPF
BASOPHILS # BLD: 0.02 K/UL (ref 0–0.2)
BASOPHILS NFR BLD: 0.6 % (ref 0–2)
BILIRUB SERPL-MCNC: 0.3 MG/DL (ref 0–1.2)
BILIRUB UR QL: NEGATIVE
BUN SERPL-MCNC: 14 MG/DL (ref 8–23)
CALCIUM SERPL-MCNC: 9.6 MG/DL (ref 8.8–10.2)
CHLORIDE SERPL-SCNC: 105 MMOL/L (ref 98–107)
CHOLEST SERPL-MCNC: 246 MG/DL (ref 0–200)
CO2 SERPL-SCNC: 29 MMOL/L (ref 20–29)
COLOR UR: ABNORMAL
CREAT SERPL-MCNC: 1.04 MG/DL (ref 0.6–1.1)
CREAT UR-MCNC: 139 MG/DL (ref 28–217)
DIFFERENTIAL METHOD BLD: ABNORMAL
EOSINOPHIL # BLD: 0.07 K/UL (ref 0–0.8)
EOSINOPHIL NFR BLD: 2.1 % (ref 0.5–7.8)
EPI CELLS #/AREA URNS HPF: ABNORMAL /HPF (ref 0–5)
ERYTHROCYTE [DISTWIDTH] IN BLOOD BY AUTOMATED COUNT: 15.3 % (ref 11.9–14.6)
EST. AVERAGE GLUCOSE BLD GHB EST-MCNC: 117 MG/DL
GLOBULIN SER CALC-MCNC: 3.5 G/DL (ref 2.3–3.5)
GLUCOSE SERPL-MCNC: 87 MG/DL (ref 70–99)
GLUCOSE UR STRIP.AUTO-MCNC: NEGATIVE MG/DL
HBA1C MFR BLD: 5.7 % (ref 0–5.6)
HCT VFR BLD AUTO: 41.7 % (ref 35.8–46.3)
HDLC SERPL-MCNC: 62 MG/DL (ref 40–60)
HDLC SERPL: 4 (ref 0–5)
HGB BLD-MCNC: 13.4 G/DL (ref 11.7–15.4)
HGB UR QL STRIP: NEGATIVE
HYALINE CASTS URNS QL MICRO: ABNORMAL /LPF
IMM GRANULOCYTES # BLD AUTO: 0.01 K/UL (ref 0–0.5)
IMM GRANULOCYTES NFR BLD AUTO: 0.3 % (ref 0–5)
KETONES UR QL STRIP.AUTO: NEGATIVE MG/DL
LDLC SERPL CALC-MCNC: 151 MG/DL (ref 0–100)
LEUKOCYTE ESTERASE UR QL STRIP.AUTO: ABNORMAL
LYMPHOCYTES # BLD: 1.23 K/UL (ref 0.5–4.6)
LYMPHOCYTES NFR BLD: 36.2 % (ref 13–44)
MCH RBC QN AUTO: 28.8 PG (ref 26.1–32.9)
MCHC RBC AUTO-ENTMCNC: 32.1 G/DL (ref 31.4–35)
MCV RBC AUTO: 89.5 FL (ref 82–102)
MICROALBUMIN UR-MCNC: <1.2 MG/DL (ref 0–20)
MICROALBUMIN/CREAT UR-RTO: NORMAL MG/G (ref 0–30)
MONOCYTES # BLD: 0.35 K/UL (ref 0.1–1.3)
MONOCYTES NFR BLD: 10.3 % (ref 4–12)
NEUTS SEG # BLD: 1.72 K/UL (ref 1.7–8.2)
NEUTS SEG NFR BLD: 50.5 % (ref 43–78)
NITRITE UR QL STRIP.AUTO: NEGATIVE
NRBC # BLD: 0 K/UL (ref 0–0.2)
PH UR STRIP: 7 (ref 5–9)
PLATELET # BLD AUTO: 184 K/UL (ref 150–450)
PMV BLD AUTO: 12 FL (ref 9.4–12.3)
POTASSIUM SERPL-SCNC: 4.3 MMOL/L (ref 3.5–5.1)
PROT SERPL-MCNC: 6.8 G/DL (ref 6.3–8.2)
PROT UR STRIP-MCNC: NEGATIVE MG/DL
RBC # BLD AUTO: 4.66 M/UL (ref 4.05–5.2)
RBC #/AREA URNS HPF: ABNORMAL /HPF (ref 0–5)
SODIUM SERPL-SCNC: 144 MMOL/L (ref 136–145)
SP GR UR REFRACTOMETRY: 1.02 (ref 1–1.02)
T4 FREE SERPL-MCNC: 1 NG/DL (ref 0.9–1.7)
TRIGL SERPL-MCNC: 164 MG/DL (ref 0–150)
TSH, 3RD GENERATION: 2.32 UIU/ML (ref 0.27–4.2)
UROBILINOGEN UR QL STRIP.AUTO: 0.2 EU/DL (ref 0.2–1)
VLDLC SERPL CALC-MCNC: 33 MG/DL (ref 6–23)
WBC # BLD AUTO: 3.4 K/UL (ref 4.3–11.1)
WBC URNS QL MICRO: ABNORMAL /HPF (ref 0–4)

## 2025-03-04 ENCOUNTER — OFFICE VISIT (OUTPATIENT)
Dept: INTERNAL MEDICINE CLINIC | Facility: CLINIC | Age: 70
End: 2025-03-04

## 2025-03-04 VITALS
WEIGHT: 197 LBS | HEART RATE: 67 BPM | DIASTOLIC BLOOD PRESSURE: 84 MMHG | TEMPERATURE: 98.1 F | HEIGHT: 63 IN | SYSTOLIC BLOOD PRESSURE: 140 MMHG | BODY MASS INDEX: 34.91 KG/M2 | RESPIRATION RATE: 18 BRPM | OXYGEN SATURATION: 98 %

## 2025-03-04 DIAGNOSIS — I48.0 PAF (PAROXYSMAL ATRIAL FIBRILLATION) (HCC): ICD-10-CM

## 2025-03-04 DIAGNOSIS — G47.33 OBSTRUCTIVE SLEEP APNEA SYNDROME: ICD-10-CM

## 2025-03-04 DIAGNOSIS — I10 ESSENTIAL HYPERTENSION, BENIGN: ICD-10-CM

## 2025-03-04 DIAGNOSIS — E55.9 VITAMIN D INSUFFICIENCY: ICD-10-CM

## 2025-03-04 DIAGNOSIS — E78.2 MIXED HYPERLIPIDEMIA: ICD-10-CM

## 2025-03-04 DIAGNOSIS — E11.9 DIABETES MELLITUS WITHOUT COMPLICATION (HCC): ICD-10-CM

## 2025-03-04 DIAGNOSIS — I49.5 TACHYCARDIA-BRADYCARDIA SYNDROME (HCC): ICD-10-CM

## 2025-03-04 DIAGNOSIS — Z00.00 ENCOUNTER FOR ANNUAL WELLNESS EXAM IN MEDICARE PATIENT: Primary | ICD-10-CM

## 2025-03-04 DIAGNOSIS — Z12.11 SCREEN FOR COLON CANCER: ICD-10-CM

## 2025-03-04 PROBLEM — I48.91 ATRIAL FIBRILLATION WITH RVR (HCC): Status: RESOLVED | Noted: 2024-12-09 | Resolved: 2025-03-04

## 2025-03-04 RX ORDER — TRIAMCINOLONE ACETONIDE 5 MG/G
OINTMENT TOPICAL 2 TIMES DAILY
Qty: 15 G | Refills: 2 | Status: SHIPPED | OUTPATIENT
Start: 2025-03-04

## 2025-03-04 SDOH — ECONOMIC STABILITY: FOOD INSECURITY: WITHIN THE PAST 12 MONTHS, THE FOOD YOU BOUGHT JUST DIDN'T LAST AND YOU DIDN'T HAVE MONEY TO GET MORE.: NEVER TRUE

## 2025-03-04 SDOH — ECONOMIC STABILITY: FOOD INSECURITY: WITHIN THE PAST 12 MONTHS, YOU WORRIED THAT YOUR FOOD WOULD RUN OUT BEFORE YOU GOT MONEY TO BUY MORE.: NEVER TRUE

## 2025-03-04 ASSESSMENT — ENCOUNTER SYMPTOMS
BLOOD IN STOOL: 0
SINUS PRESSURE: 0
VOMITING: 0
DIARRHEA: 0
CHEST TIGHTNESS: 0
NAUSEA: 0
SHORTNESS OF BREATH: 0
ABDOMINAL PAIN: 0
COUGH: 0
RHINORRHEA: 0

## 2025-03-04 ASSESSMENT — PATIENT HEALTH QUESTIONNAIRE - PHQ9
SUM OF ALL RESPONSES TO PHQ QUESTIONS 1-9: 11
8. MOVING OR SPEAKING SO SLOWLY THAT OTHER PEOPLE COULD HAVE NOTICED. OR THE OPPOSITE, BEING SO FIGETY OR RESTLESS THAT YOU HAVE BEEN MOVING AROUND A LOT MORE THAN USUAL: NOT AT ALL
9. THOUGHTS THAT YOU WOULD BE BETTER OFF DEAD, OR OF HURTING YOURSELF: NOT AT ALL
2. FEELING DOWN, DEPRESSED OR HOPELESS: MORE THAN HALF THE DAYS
3. TROUBLE FALLING OR STAYING ASLEEP: MORE THAN HALF THE DAYS
5. POOR APPETITE OR OVEREATING: NEARLY EVERY DAY
SUM OF ALL RESPONSES TO PHQ QUESTIONS 1-9: 11
SUM OF ALL RESPONSES TO PHQ QUESTIONS 1-9: 11
1. LITTLE INTEREST OR PLEASURE IN DOING THINGS: MORE THAN HALF THE DAYS
7. TROUBLE CONCENTRATING ON THINGS, SUCH AS READING THE NEWSPAPER OR WATCHING TELEVISION: SEVERAL DAYS
4. FEELING TIRED OR HAVING LITTLE ENERGY: SEVERAL DAYS
6. FEELING BAD ABOUT YOURSELF - OR THAT YOU ARE A FAILURE OR HAVE LET YOURSELF OR YOUR FAMILY DOWN: NOT AT ALL
10. IF YOU CHECKED OFF ANY PROBLEMS, HOW DIFFICULT HAVE THESE PROBLEMS MADE IT FOR YOU TO DO YOUR WORK, TAKE CARE OF THINGS AT HOME, OR GET ALONG WITH OTHER PEOPLE: SOMEWHAT DIFFICULT
SUM OF ALL RESPONSES TO PHQ QUESTIONS 1-9: 11

## 2025-03-04 NOTE — PROGRESS NOTES
90s  Patient compliant with low carbohydrate diet, with occasional dietary indiscretions.  Patient is sedentary and does not exercise.  Regular exercise recommended.  Patient advised on foot care and regular foot exam.  Last eye exam:  Last HgbA1c: 3/2025  Lab Results   Component Value Date    LABA1C 5.7 (H) 02/20/2025     Lab Results   Component Value Date     02/20/2025          Past Medical History:   Diagnosis Date    Atrial fibrillation (HCC)     DM2 (diabetes mellitus, type 2) ([facility])     History of bone density study 2022    History of cervical cancer     History of Papanicolaou smear of cervix 10/2023    Dr. Gupta    HTN (hypertension)     Hyperlipidemia     Neuropathy     PAF (paroxysmal atrial fibrillation) (HCC)        Past Surgical History:   Procedure Laterality Date    TOTAL HIP ARTHROPLASTY  01/07/2019       Family History   Problem Relation Age of Onset    Diabetes Mother     Arthritis Mother     Hypertension Father     Diabetes Maternal Grandmother        Social History     Tobacco Use    Smoking status: Never     Passive exposure: Never    Smokeless tobacco: Never   Vaping Use    Vaping status: Never Used   Substance Use Topics    Alcohol use: Not Currently    Drug use: Never                 Current Outpatient Medications:     VITAMIN D PO, Take by mouth, Disp: , Rfl:     triamcinolone (ARISTOCORT) 0.5 % ointment, Apply topically 2 times daily Apply topically 2 times daily, prn, Disp: 15 g, Rfl: 2    amLODIPine (NORVASC) 5 MG tablet, Take 1 tablet by mouth daily, Disp: , Rfl:     Misc. Devices MISC, DM shoes, Disp: 1 each, Rfl: 0    potassium chloride (KLOR-CON) 20 MEQ packet, Take 20 mEq by mouth 2 times daily, Disp: , Rfl:     losartan (COZAAR) 100 MG tablet, Take 1 tablet by mouth daily, Disp: , Rfl:     Multiple Vitamin (MULTIVITAMIN) TABS tablet, Take 1 tablet by mouth daily, Disp: , Rfl:     dilTIAZem (CARDIZEM) 30 MG tablet, Take 0.5 tablets by mouth every 6 hours (Patient not taking:

## 2025-03-04 NOTE — PATIENT INSTRUCTIONS
these orders (if applicable) as well as your Preventive Care list are included within your After Visit Summary for your review.

## 2025-05-26 ENCOUNTER — TELEPHONE (OUTPATIENT)
Dept: INTERNAL MEDICINE CLINIC | Facility: CLINIC | Age: 70
End: 2025-05-26

## 2025-06-05 ENCOUNTER — OFFICE VISIT (OUTPATIENT)
Dept: INTERNAL MEDICINE CLINIC | Facility: CLINIC | Age: 70
End: 2025-06-05
Payer: MEDICARE

## 2025-06-05 ENCOUNTER — HOSPITAL ENCOUNTER (OUTPATIENT)
Dept: ULTRASOUND IMAGING | Age: 70
Discharge: HOME | End: 2025-06-07
Payer: MEDICARE

## 2025-06-05 VITALS
SYSTOLIC BLOOD PRESSURE: 130 MMHG | HEIGHT: 63 IN | OXYGEN SATURATION: 97 % | TEMPERATURE: 97.7 F | WEIGHT: 196 LBS | DIASTOLIC BLOOD PRESSURE: 80 MMHG | BODY MASS INDEX: 34.73 KG/M2 | HEART RATE: 61 BPM

## 2025-06-05 DIAGNOSIS — M79.89 LEFT LEG SWELLING: Primary | ICD-10-CM

## 2025-06-05 DIAGNOSIS — M25.472 LEFT ANKLE SWELLING: ICD-10-CM

## 2025-06-05 DIAGNOSIS — M79.89 LEFT LEG SWELLING: ICD-10-CM

## 2025-06-05 DIAGNOSIS — I48.0 PAF (PAROXYSMAL ATRIAL FIBRILLATION) (HCC): ICD-10-CM

## 2025-06-05 PROCEDURE — G8400 PT W/DXA NO RESULTS DOC: HCPCS | Performed by: NURSE PRACTITIONER

## 2025-06-05 PROCEDURE — 1123F ACP DISCUSS/DSCN MKR DOCD: CPT | Performed by: NURSE PRACTITIONER

## 2025-06-05 PROCEDURE — 93971 EXTREMITY STUDY: CPT

## 2025-06-05 PROCEDURE — 3079F DIAST BP 80-89 MM HG: CPT | Performed by: NURSE PRACTITIONER

## 2025-06-05 PROCEDURE — G8417 CALC BMI ABV UP PARAM F/U: HCPCS | Performed by: NURSE PRACTITIONER

## 2025-06-05 PROCEDURE — G2211 COMPLEX E/M VISIT ADD ON: HCPCS | Performed by: NURSE PRACTITIONER

## 2025-06-05 PROCEDURE — 1090F PRES/ABSN URINE INCON ASSESS: CPT | Performed by: NURSE PRACTITIONER

## 2025-06-05 PROCEDURE — 3075F SYST BP GE 130 - 139MM HG: CPT | Performed by: NURSE PRACTITIONER

## 2025-06-05 PROCEDURE — 1160F RVW MEDS BY RX/DR IN RCRD: CPT | Performed by: NURSE PRACTITIONER

## 2025-06-05 PROCEDURE — 99214 OFFICE O/P EST MOD 30 MIN: CPT | Performed by: NURSE PRACTITIONER

## 2025-06-05 PROCEDURE — 93971 EXTREMITY STUDY: CPT | Performed by: RADIOLOGY

## 2025-06-05 PROCEDURE — 3017F COLORECTAL CA SCREEN DOC REV: CPT | Performed by: NURSE PRACTITIONER

## 2025-06-05 PROCEDURE — 1036F TOBACCO NON-USER: CPT | Performed by: NURSE PRACTITIONER

## 2025-06-05 PROCEDURE — G8427 DOCREV CUR MEDS BY ELIG CLIN: HCPCS | Performed by: NURSE PRACTITIONER

## 2025-06-05 PROCEDURE — 1159F MED LIST DOCD IN RCRD: CPT | Performed by: NURSE PRACTITIONER

## 2025-06-05 ASSESSMENT — ENCOUNTER SYMPTOMS
VOMITING: 0
SHORTNESS OF BREATH: 0
WHEEZING: 0
COUGH: 0
NAUSEA: 0
ABDOMINAL PAIN: 0
COLOR CHANGE: 0

## 2025-06-05 NOTE — PROGRESS NOTES
[facility]      2025    Patient Name: Evelina Nunez  :  1955      Chief Complaint:  Chief Complaint   Patient presents with    Joint Swelling     Left ankle, week         HPI  Patient presents today with complaint of left ankle swelling. Symptoms started about 1.5 weeks ago. Swelling comes and goes; worsens as the day progresses. Associated symptoms include left ankle discomfort when the swelling is present. Of note, patient has history of atrial fibrillation. Followed by [facility]. Previously on Eliquis but discontinued earlier this year (at her own discretion) due to bruising and joint pain. She has been using garlic and turmeric for anticoagulation instead. Per 3/14/25 cardiology note, \"Patient preferred to be on a medication that could have blood levels monitored, and thus warfarin was ordered patient did not start it. In December she had a episode of tachycardia and presented to [facility] ER. She was noted to be in A-fib with RVR and was started on diltiazem drip. When she converted to sinus rhythm she was bradycardic. Since then she has not been on any AV alyssa blocking agents. She has had occasional palpitations but no syncope or near syncope.\" Denies any recent palpitations, CP, SOB, dizziness or lightheadedness. Denies any recent long-distance travel.         Past Medical History:   Diagnosis Date    Atrial fibrillation (HCC)     DM2 (diabetes mellitus, type 2) (HCC)     History of bone density study     History of cervical cancer     History of Papanicolaou smear of cervix 10/2023    Dr. Gupta    HTN (hypertension)     Hyperlipidemia     Neuropathy     PAF (paroxysmal atrial fibrillation) ([facility])        Past Surgical History:   Procedure Laterality Date    TOTAL HIP ARTHROPLASTY  2019       Family History   Problem Relation Age of Onset    Diabetes Mother     Arthritis Mother     Hypertension Father     Diabetes Maternal Grandmother        Social History

## 2025-06-06 ENCOUNTER — RESULTS FOLLOW-UP (OUTPATIENT)
Dept: INTERNAL MEDICINE CLINIC | Facility: CLINIC | Age: 70
End: 2025-06-06

## 2025-06-21 ENCOUNTER — RESULTS FOLLOW-UP (OUTPATIENT)
Dept: INTERNAL MEDICINE CLINIC | Facility: CLINIC | Age: 70
End: 2025-06-21